# Patient Record
Sex: MALE | Race: WHITE | NOT HISPANIC OR LATINO | Employment: OTHER | URBAN - METROPOLITAN AREA
[De-identification: names, ages, dates, MRNs, and addresses within clinical notes are randomized per-mention and may not be internally consistent; named-entity substitution may affect disease eponyms.]

---

## 2018-06-18 ENCOUNTER — OFFICE VISIT (OUTPATIENT)
Dept: ORTHOPEDIC SURGERY | Facility: CLINIC | Age: 48
End: 2018-06-18

## 2018-06-18 VITALS — BODY MASS INDEX: 34.52 KG/M2 | HEART RATE: 86 BPM | WEIGHT: 254.85 LBS | OXYGEN SATURATION: 98 % | HEIGHT: 72 IN

## 2018-06-18 DIAGNOSIS — M25.511 CHRONIC PAIN OF BOTH SHOULDERS: Primary | ICD-10-CM

## 2018-06-18 DIAGNOSIS — M75.91 SUPRASPINATUS TENDINITIS, RIGHT: ICD-10-CM

## 2018-06-18 DIAGNOSIS — G89.29 CHRONIC PAIN OF BOTH SHOULDERS: Primary | ICD-10-CM

## 2018-06-18 DIAGNOSIS — M75.92 SUPRASPINATUS TENDINITIS, LEFT: ICD-10-CM

## 2018-06-18 DIAGNOSIS — M25.512 CHRONIC PAIN OF BOTH SHOULDERS: Primary | ICD-10-CM

## 2018-06-18 PROCEDURE — 99204 OFFICE O/P NEW MOD 45 MIN: CPT | Performed by: ORTHOPAEDIC SURGERY

## 2018-06-18 RX ORDER — IBUPROFEN 600 MG/1
600 TABLET ORAL EVERY 6 HOURS PRN
COMMUNITY
End: 2020-08-31

## 2018-06-18 RX ORDER — LORATADINE 10 MG/1
10 TABLET ORAL DAILY
COMMUNITY
End: 2020-08-31

## 2018-06-18 NOTE — PROGRESS NOTES
[facility] [place] [facility] Note    Subjective     Chief Complaint   Patient presents with   • Left Shoulder - Pain   • Right Shoulder - Pain        HPI      Merlin Francois is a 48 y.o. male.  He complains of bilateral shoulder pain.  Pain for 3 years.  Pain is 7-10 out of 10 at times.  Pain is dull aching and throbbing.  He has not had treatment.  There is no specific injury.  He had complications from a general surgery in [place] 2013.  He is in litigation.  He cannot go back therefore therapy or treatment.  He wants something for pain but cannot take anti-inflammatories because they upset his stomach.  He has not been in pain management before.        Past Medical History:   Diagnosis Date   • COPD (chronic obstructive pulmonary disease)    • Ototoxicity     Extensive abdominal damage      Past Surgical History:   Procedure Laterality Date   • COLECTOMY PARTIAL / TOTAL      x3       Family History   Problem Relation Age of Onset   • Osteoarthritis Mother    • Diabetes Mother    • Heart attack Mother    • Cancer Father    • Osteoarthritis Father      Social History     Social History   • Marital status:      Spouse name: N/A   • Number of children: N/A   • Years of education: N/A     Occupational History   • Not on file.     Social History Main Topics   • Smoking status: Former Smoker     Packs/day: 1.00     Years: 5.00     Types: Cigarettes     Quit date: 2006   • Smokeless tobacco: Never Used   • Alcohol use No   • Drug use: No   • Sexual activity: Not on file     Other Topics Concern   • Not on file     Social History Narrative   • No narrative on file      No current outpatient prescriptions on file prior to visit.     No current facility-administered medications on file prior to visit.       No Known Allergies     The following portions of the patient's history were reviewed and updated as appropriate: allergies, current medications, past family history, past medical history, past social  "history, past surgical history and problem list.    Review of Systems   HENT: Positive for sinus pressure and tinnitus.    Eyes: Positive for visual disturbance.   Respiratory: Negative.    Cardiovascular: Negative.    Gastrointestinal: Negative.    Endocrine: Negative.    Genitourinary: Negative.    Musculoskeletal: Positive for arthralgias (Bilateral shoulders), joint swelling, neck pain and neck stiffness.   Skin: Negative.    Allergic/Immunologic: Positive for environmental allergies.   Neurological: Positive for dizziness and weakness.   Hematological: Negative.    Psychiatric/Behavioral: Negative.         Objective      Physical Exam  Pulse 86   Ht 182 cm (71.65\")   Wt 116 kg (254 lb 13.6 oz)   SpO2 98%   BMI 34.90 kg/m²     Body mass index is 34.9 kg/m².        GENERAL APPEARANCE: awake, alert & oriented x 3, in no acute distress and well developed, well nourished  PSYCH: normal mood and affect  LUNGS:  breathing nonlabored, no wheezing  EYES: sclera anicteric, pupils equal  CARDIOVASCULAR: palpable pulses dorsalis pedis, palpable posterior tibial bilaterally. Capillary refill less than 2 seconds  INTEGUMENTARY: skin intact, no clubbing, cyanosis  NEUROLOGIC:  Normal Sensation and reflexes             Ortho Exam  Musculoskeletal   Upper Extremity   Right Shoulder     Inspection and Palpation:     Tenderness - none    Crepitus - none    Sensation is normal    Examination reveals no ecchymosis.      Strength and Tone:    Supraspinatus,  Infraspinatus - 5/5    Subscapularis - 5/5    Deltoid - 5/5     Range of Motion   Left shoulder:    Internal Rotation: ROM - T7    External Rotation: AROM - 80 degrees    Elevation through flexion: AROM - 80 degrees    Right Shoulder:    Internal Rotation: ROM - T7    External Rotation: AROM - 80 degrees    Elevation through flexion: AROM - 80 degrees     Abduction -80 degrees     Instability   Right shoulder    Sulcus sign negative    Apprehension test negative    Patrice " relocation test negative    Jerk test negative   Impingement   Right shoulder    Ott impingement test positive    Neer impingement test positive   Functional Testing   Right shoulder    AC crossover adduction test negative    Abdominal compression test negative    Lift-off sign negative    Speed's test negative    Reece's test negative    Horriblower's sign negative       Imaging/Studies  Imaging Results (last 7 days)     Procedure Component Value Units Date/Time    XR Shoulder 2+ View Bilateral [[id number]] Resulted:  06/18/18 1415     Updated:  06/18/18 1415    Narrative:       Right Shoulder X-Ray  Indication: Pain  AP, scapular Y, and axillary lateral views    Findings:  No fracture  No bony lesion  Normal soft tissues  Normal joint spaces    No prior studies were available for comparison.  Left Shoulder X-Ray  Indication: Pain  AP, scapular Y, and axillary lateral views    Findings:  No fracture  No bony lesion  Normal soft tissues  Normal joint spaces    No prior studies were available for comparison.              Assessment/Plan        ICD-10-CM ICD-9-CM   1. Chronic pain of both shoulders M25.511 719.41    G89.29 338.29    M25.512    2. Supraspinatus tendinitis, right M75.91 726.10   3. Supraspinatus tendinitis, left M75.92 726.10       Orders Placed This Encounter   Procedures   • XR Shoulder 2+ View Bilateral   • Ambulatory Referral to Physical Therapy   • Ambulatory Referral to Pain Management    He has signs of bilateral shoulder impingement.  I offered physical therapy does not want to go.  He says it'll hurt too much.  He cannot take anti-inflammatories because of GI intolerance.  I will refer to pain management as that is his main complaint is wanting something for pain.  He needs physical therapy and I'll see him back in 1 month.  He does not want to go to physical therapy.    Medical Decision Making  Management Options : over-the-counter medicine and physical/occupational therapy  Data/Risk:  radiology tests and independent visualization of imaging, lab tests, or EMG/NCV    Robbie Wasserman MD  06/18/18  2:28 PM         EMR Dragon/Transcription disclaimer:  Much of this encounter note is an electronic transcription of spoken language to printed text. Electronic transcription of spoken language may permit erroneous, or at times, nonsensical words or phrases to be inadvertently transcribed. Although I have reviewed the note for such errors, some may still exist.

## 2019-05-20 ENCOUNTER — OFFICE VISIT (OUTPATIENT)
Dept: FAMILY MEDICINE CLINIC | Facility: CLINIC | Age: 49
End: 2019-05-20

## 2019-05-20 VITALS
RESPIRATION RATE: 18 BRPM | SYSTOLIC BLOOD PRESSURE: 140 MMHG | HEART RATE: 82 BPM | BODY MASS INDEX: 36.57 KG/M2 | DIASTOLIC BLOOD PRESSURE: 92 MMHG | WEIGHT: 270 LBS | TEMPERATURE: 98.5 F | HEIGHT: 72 IN

## 2019-05-20 DIAGNOSIS — G89.29 CHRONIC PAIN OF BOTH SHOULDERS: ICD-10-CM

## 2019-05-20 DIAGNOSIS — M25.512 CHRONIC PAIN OF BOTH SHOULDERS: ICD-10-CM

## 2019-05-20 DIAGNOSIS — J30.1 ALLERGIC RHINITIS DUE TO POLLEN, UNSPECIFIED SEASONALITY: ICD-10-CM

## 2019-05-20 DIAGNOSIS — M25.511 CHRONIC PAIN OF BOTH SHOULDERS: ICD-10-CM

## 2019-05-20 DIAGNOSIS — J45.30 MILD PERSISTENT ASTHMA WITHOUT COMPLICATION: ICD-10-CM

## 2019-05-20 DIAGNOSIS — J01.11 ACUTE RECURRENT FRONTAL SINUSITIS: Primary | ICD-10-CM

## 2019-05-20 PROCEDURE — 99203 OFFICE O/P NEW LOW 30 MIN: CPT | Performed by: FAMILY MEDICINE

## 2019-05-20 RX ORDER — ALBUTEROL SULFATE 90 UG/1
AEROSOL, METERED RESPIRATORY (INHALATION)
COMMUNITY
End: 2020-12-11 | Stop reason: SDUPTHER

## 2019-05-20 RX ORDER — PREDNISONE 20 MG/1
TABLET ORAL
Qty: 20 TABLET | Refills: 0 | Status: SHIPPED | OUTPATIENT
Start: 2019-05-20 | End: 2019-08-14 | Stop reason: SDUPTHER

## 2019-05-20 RX ORDER — AMOXICILLIN AND CLAVULANATE POTASSIUM 875; 125 MG/1; MG/1
1 TABLET, FILM COATED ORAL 2 TIMES DAILY
Qty: 20 TABLET | Refills: 0 | Status: SHIPPED | OUTPATIENT
Start: 2019-05-20 | End: 2019-08-14 | Stop reason: SDUPTHER

## 2019-05-20 NOTE — PROGRESS NOTES
Assessment/Plan       Problems Addressed this Visit     None      Visit Diagnoses     Acute recurrent frontal sinusitis    -  Primary    Relevant Medications    amoxicillin-clavulanate (AUGMENTIN) 875-125 MG per tablet    predniSONE (DELTASONE) 20 MG tablet    Allergic rhinitis due to pollen, unspecified seasonality        Chronic pain of both shoulders        Mild persistent asthma without complication        Relevant Medications    albuterol sulfate  (90 Base) MCG/ACT inhaler            Follow up: Return for follow up depends on review of labs and testing.     DISCUSSION  Acute recurrent frontal sinusitis.  Start Augmentin.  High-dose prednisone as noted.  Side effects explained.  Start prednisone in the morning.  This hopefully should help the headache.    Chronic pain of the shoulders.  Need to review old records before making any further recommendations.  He has been to pain management.  He is unable to afford physical therapy.  Reportedly he has been to orthopedics as well.  MRI has been done and we need to review those records before making any further suggestions.    Chronic allergic rhinitis.  Continue follow-up with  allergist.  He is currently on Xolair injections and I explained that we would not be able to give those injections here in the office.  He expressed understanding.    Mild persistent asthma.  Continue albuterol as needed.      MEDICATIONS PRESCRIBED  Requested Prescriptions     Signed Prescriptions Disp Refills   • amoxicillin-clavulanate (AUGMENTIN) 875-125 MG per tablet 20 tablet 0     Sig: Take 1 tablet by mouth 2 (Two) Times a Day.   • predniSONE (DELTASONE) 20 MG tablet 20 tablet 0     Sig: 3 po x 3 days then 2 po x 3 days then 1 po x 3 days then 1/2 po x 3 days        Ulysses dated 5/20/2019 was reviewed and appropriate.      -------------------------------------------    Subjective     Chief Complaint   Patient presents with   • Establish Care   • Allergies   • Headache      "starts hurting to makes him sick          History of Present Illness    Shoulders  Both pain   Pain x years   and power   X 4 yrs   No recent eval and had been referred to P T  MRIs of shoulders 18 months ago  + arthritis and rotator cuff issues      \"autotoxicity\"  Chronic pain   Had seen Dr Corcoran and rec P T and he is reluctant to do that.   Jewell tried shots in the shoulders      Had seen Dr Ortiz in the past    Colectomy  Had reversal that went bad  2013   Diverticulitis and on antibiotic  Then had colectomy done for this. Dr Thomas  Had complications and \"laid for 4 days \"  Had ileostomy for 1 yr and then reversed ( Dr Montoya)  Has normal BM now  Dr Thomas  2 days after the surg    Allergic rhinitis/sinusitis  Terrible  Dr Shipman at [facility]   On Xolair  Not sure if working    Had been on Zpak  Off antibiotic and getting sick again     Headache + drainage  + prod sputum  No recent antibiotic beside the zpak    No fever  + green discharge       and power         Social History     Tobacco Use   Smoking Status Former Smoker   • Packs/day: 1.00   • Years: 5.00   • Pack years: 5.00   • Types: Cigarettes   • Last attempt to quit:    • Years since quittin.3   Smokeless Tobacco Never Used        Past Medical History,Medications, Allergies, and social history was reviewed.      Review of Systems   Constitutional: Negative.    HENT: Positive for congestion and sinus pressure.    Respiratory: Negative.    Cardiovascular: Negative.    Gastrointestinal: Negative.          Large abdominal defect due to history of surgery   Musculoskeletal: Negative.    Neurological: Positive for headache.   Psychiatric/Behavioral: Negative.        Objective     Vitals:    19 1537   BP: 140/92   Pulse: 82   Resp: 18   Temp: 98.5 °F (36.9 °C)   Weight: 122 kg (270 lb)   Height: 182 cm (71.65\")          Physical Exam   Constitutional: Vital signs are normal. He " appears well-developed and well-nourished.   HENT:   Head: Normocephalic and atraumatic.   Right Ear: Hearing, tympanic membrane, external ear and ear canal normal.   Left Ear: Hearing, tympanic membrane, external ear and ear canal normal.   Mouth/Throat: Oropharynx is clear and moist.   Eyes: Conjunctivae, EOM and lids are normal. Pupils are equal, round, and reactive to light.   Neck: Normal range of motion. Neck supple. No thyromegaly present.   Cardiovascular: Normal rate, regular rhythm and normal heart sounds. Exam reveals no friction rub.   No murmur heard.  Pulmonary/Chest: Effort normal and breath sounds normal. No respiratory distress. He has no wheezes. He has no rales.   Abdominal: Soft. Normal appearance and bowel sounds are normal. He exhibits no distension and no mass. There is no tenderness. There is no rebound and no guarding.   Large midline abdominal defect due to surgery with colectomy/ileostomy and ileostomy reversal.  Had reported surgical complications.   Musculoskeletal: He exhibits no edema.        Right shoulder: He exhibits decreased range of motion ( Pain with abduction). He exhibits no tenderness.        Left shoulder: He exhibits decreased range of motion ( Pain with full abduction). He exhibits no tenderness.   Neurological: He is alert. He has normal strength.   Skin: Skin is warm and dry.   Psychiatric: He has a normal mood and affect. His speech is normal. Cognition and memory are normal.   Nursing note and vitals reviewed.                Pierre Barajas MD

## 2019-05-23 ENCOUNTER — TELEPHONE (OUTPATIENT)
Dept: FAMILY MEDICINE CLINIC | Facility: CLINIC | Age: 49
End: 2019-05-23

## 2019-07-05 ENCOUNTER — TELEPHONE (OUTPATIENT)
Dept: FAMILY MEDICINE CLINIC | Facility: CLINIC | Age: 49
End: 2019-07-05

## 2019-07-05 NOTE — TELEPHONE ENCOUNTER
Please call, we did not prescribe this so he will need to contact the specialist that prescribed this.

## 2019-07-05 NOTE — TELEPHONE ENCOUNTER
----- Message from Martha Sanabria sent at 7/5/2019  2:29 PM EDT -----  Contact: DR GUEVARA  PATIENT WANTS TO KNOW WHAT HE SHOULD DO SINCE THE TRELLEGY IS NOT BEING APPROVED BY THE INSURANCE COMPANY? HE SAYS IT IS THE ONLY THING THAT HELPS HIM. PLEASE CALL BACK TO DISCUSS THIS AT [phone]

## 2019-08-12 ENCOUNTER — TELEPHONE (OUTPATIENT)
Dept: FAMILY MEDICINE CLINIC | Facility: CLINIC | Age: 49
End: 2019-08-12

## 2019-08-12 DIAGNOSIS — J01.11 ACUTE RECURRENT FRONTAL SINUSITIS: ICD-10-CM

## 2019-08-12 NOTE — TELEPHONE ENCOUNTER
----- Message from Mila Correia sent at 8/12/2019  4:06 PM EDT -----  Contact: ISMAEL / PT CALL  PT CALLED AND STATED THAT HE HAS A SINUS INFECTION AND WOULD LIKE AN RX CALLED IN  - HE ALSO WANTED TO REMIND DR GUEVARA HE HAS COPD AND ASTHMA - HE IS REQUESTED A STEROID AND ANTBIOTIC    PT CALL BACK [phone]    [facility]

## 2019-08-13 NOTE — TELEPHONE ENCOUNTER
Please call and confirm how long he has had the symptoms?  Did he get better back in May with what we gave him then?    Has he had a fever?  Discolored drainage?

## 2019-08-14 ENCOUNTER — TELEPHONE (OUTPATIENT)
Dept: FAMILY MEDICINE CLINIC | Facility: CLINIC | Age: 49
End: 2019-08-14

## 2019-08-14 RX ORDER — PREDNISONE 20 MG/1
TABLET ORAL
Qty: 20 TABLET | Refills: 0 | Status: SHIPPED | OUTPATIENT
Start: 2019-08-14 | End: 2020-07-10

## 2019-08-14 RX ORDER — AMOXICILLIN AND CLAVULANATE POTASSIUM 875; 125 MG/1; MG/1
1 TABLET, FILM COATED ORAL 2 TIMES DAILY
Qty: 20 TABLET | Refills: 0 | Status: SHIPPED | OUTPATIENT
Start: 2019-08-14 | End: 2020-07-10

## 2019-08-14 NOTE — TELEPHONE ENCOUNTER
Please call, yes , he would nned office visit to assess this and I did send in meds for sinus. See other message if not notified.

## 2019-08-14 NOTE — TELEPHONE ENCOUNTER
Pt calling to check status? Please advise if you can call in medications? He states he is in and out of court/trials and unable to come in right now.

## 2019-08-14 NOTE — TELEPHONE ENCOUNTER
He has had these symptoms for about 4 days, he now has green mucus, congestion, sneezing. What you gave him back in may helped him a lot and would like a refill of that. The prednisone and augmentin helped a lot.       Pharmacy   [facility]

## 2019-08-14 NOTE — TELEPHONE ENCOUNTER
Patient called back was wondering does he need a updated mri for his shoulders or what. His shoulders have gotten to the point it is keeping him up at night stated he can come in next week or so to decide this if needed     Call back number [phone] may left detail vm

## 2019-11-06 ENCOUNTER — TELEPHONE (OUTPATIENT)
Dept: FAMILY MEDICINE CLINIC | Facility: CLINIC | Age: 49
End: 2019-11-06

## 2019-11-06 RX ORDER — ALBUTEROL SULFATE 90 UG/1
AEROSOL, METERED RESPIRATORY (INHALATION)
Status: CANCELLED | OUTPATIENT
Start: 2019-11-06

## 2019-11-06 NOTE — TELEPHONE ENCOUNTER
Patient informed. He said he would go to the emergency room and he would not go to a doctor that wouldn't call him in something over the phone. He wanted to be charged for OV and not come if we would call in something. I advised patient that was against the law for us to do. He said he was changing PCP's and hung up.

## 2019-11-06 NOTE — TELEPHONE ENCOUNTER
PATIENT REQUESTING AN ANTIBIOTIC (ZPAK) FOR SINUS INFECTION AND A REFILL FOR ALBUTEROL INHALER- VENTOLIN HFA.   PATIENT STATED HE DOES NOT WANT TO MAKE AN APPT AND BE AROUND OTHER SICK PEOPLE AND WOULD PREFER THAT DR. GUEVARA SEND A ZPAK AND ONCE HE IS FEELING BETTER HE WILL MAKE AN APPT.     HE STATED THAT HE DOES NOT HAVE A FEVER, HE GETS A SINUS INFECTION A COUPLE TIMES A YEAR. HE WOULD LIKE THE ANTIBIOTIC AND INHALER ASAP.     PLEASE SEND TO [facility]    PATIENT CALL BACK NUMBER UPDATED: [phone]     THANK YOU

## 2019-11-06 NOTE — TELEPHONE ENCOUNTER
Please call, confirm symptoms. How long ? Discolored drainage? Sinus pain or pressure?  Wheezing or shortness of breath.    Since I am OUT of the office today, I may not be able to address this til the morning.     I urgent, then other provider may need to address.

## 2019-11-06 NOTE — TELEPHONE ENCOUNTER
Cough, itching throat, wheezing, green drainage and diarrhea all started yesterday.  Pt ask that this been sent to another provider because he cannot wait til tomorrow.

## 2019-11-06 NOTE — TELEPHONE ENCOUNTER
He would need to be seen to determine what medicine would best be needed.  Ok for [facility] or he can be seen tomorrow

## 2020-06-26 ENCOUNTER — TELEPHONE (OUTPATIENT)
Dept: FAMILY MEDICINE CLINIC | Facility: CLINIC | Age: 50
End: 2020-06-26

## 2020-06-26 NOTE — TELEPHONE ENCOUNTER
PATIENT CALLED IN WANTING TO KNOW IF A REFERRAL COULD BE PLACED FOR HIM TO GO TO A ORTHO DOCTOR ABOUT HIS SHOULDER. HE STATED THAT HE NOW HAS NO QUALITY OF LIFE, HE CAN'T EVEN DO THE DISHES  NOW.       PLEASE ADVISE IF YOU WILL SEND A REFERRAL OR IF HE NEEDS AN APPT.       CALL BACK   [phone]

## 2020-06-26 NOTE — TELEPHONE ENCOUNTER
Please call, I have not seen him since MAy 2019. He would need an appt. BUT the system shows he has an appt with Dr Wasserman on July 1. If that is the case, he would not need to see me 1st.

## 2020-07-10 ENCOUNTER — OFFICE VISIT (OUTPATIENT)
Dept: ORTHOPEDIC SURGERY | Facility: CLINIC | Age: 50
End: 2020-07-10

## 2020-07-10 VITALS — HEIGHT: 72 IN | OXYGEN SATURATION: 97 % | WEIGHT: 265 LBS | BODY MASS INDEX: 35.89 KG/M2 | HEART RATE: 78 BPM

## 2020-07-10 DIAGNOSIS — M25.511 BILATERAL SHOULDER PAIN, UNSPECIFIED CHRONICITY: Primary | ICD-10-CM

## 2020-07-10 DIAGNOSIS — M25.512 BILATERAL SHOULDER PAIN, UNSPECIFIED CHRONICITY: Primary | ICD-10-CM

## 2020-07-10 PROCEDURE — 99213 OFFICE O/P EST LOW 20 MIN: CPT | Performed by: ORTHOPAEDIC SURGERY

## 2020-07-10 NOTE — PROGRESS NOTES
[facility] [place] [facility] Note    Subjective     Chief Complaint   Patient presents with   • Right Shoulder - Pain, Follow-up   • Left Shoulder - Pain, Follow-up        HPI  Merlin Francois is a 50 y.o. male.  He has 9 out of 10 shoulder pain.  He is had it for 8 to 10 years.  Worse with activity.  He is currently disabled because of abdominal surgery.  He says he never wants any type of surgery again.  He had a cortisone shot in the past that only helped 2 days he does not want another one.  He does not want to go to physical therapy because he says that does not help.    Past Medical History:   Diagnosis Date   • Arthritis    • Asthma    • COPD (chronic obstructive pulmonary disease) (CMS/HCC)    • Diverticulosis    • Ototoxicity     Extensive abdominal damage      Past Surgical History:   Procedure Laterality Date   • COLECTOMY PARTIAL / TOTAL      x3    • COLON SURGERY        Family History   Problem Relation Age of Onset   • Osteoarthritis Mother    • Diabetes Mother    • Heart attack Mother    • Arthritis Mother    • Asthma Mother    • Hypertension Mother    • Migraines Mother    • Cancer Father    • Osteoarthritis Father    • Arthritis Father    • Liver disease Father      Social History     Socioeconomic History   • Marital status:      Spouse name: Not on file   • Number of children: Not on file   • Years of education: Not on file   • Highest education level: Not on file   Tobacco Use   • Smoking status: Former Smoker     Packs/day: 1.00     Years: 5.00     Pack years: 5.00     Types: Cigarettes     Last attempt to quit: 2005     Years since quitting: 15.5   • Smokeless tobacco: Never Used   Substance and Sexual Activity   • Alcohol use: No   • Drug use: No      Current Outpatient Medications on File Prior to Visit   Medication Sig Dispense Refill   • albuterol sulfate  (90 Base) MCG/ACT inhaler albuterol sulfate HFA 90 mcg/actuation aerosol inhaler   Inhale 2 puffs every 4 hours by  "inhalation route.     • BREO ELLIPTA 200-25 MCG/INH aerosol powder       • ibuprofen (ADVIL,MOTRIN) 600 MG tablet Take 600 mg by mouth Every 6 (Six) Hours As Needed for Mild Pain .     • loratadine (CLARITIN) 10 MG tablet Take 10 mg by mouth Daily.     • TRELEGY ELLIPTA 100-62.5-25 MCG/INH aerosol powder       • [DISCONTINUED] amoxicillin-clavulanate (AUGMENTIN) 875-125 MG per tablet Take 1 tablet by mouth 2 (Two) Times a Day. 20 tablet 0   • [DISCONTINUED] predniSONE (DELTASONE) 20 MG tablet 3 po x 3 days then 2 po x 3 days then 1 po x 3 days then 1/2 po x 3 days 20 tablet 0     No current facility-administered medications on file prior to visit.       No Known Allergies     The following portions of the patient's history were reviewed and updated as appropriate: allergies, current medications, past family history, past medical history, past social history, past surgical history and problem list.    Review of Systems   Constitutional: Negative.    HENT: Negative.    Eyes: Negative.    Respiratory: Negative.    Cardiovascular: Negative.    Gastrointestinal: Negative.    Endocrine: Negative.    Genitourinary: Negative.    Musculoskeletal: Positive for arthralgias and joint swelling.   Skin: Negative.    Allergic/Immunologic: Negative.    Neurological: Negative.    Hematological: Negative.    Psychiatric/Behavioral: Negative.         Objective      Physical Exam  Pulse 78   Ht 182 cm (71.65\")   Wt 120 kg (265 lb)   SpO2 97%   BMI 36.29 kg/m²     Body mass index is 36.29 kg/m².    GENERAL APPEARANCE: awake, alert & oriented x 3, in no acute distress and well developed, well nourished  PSYCH: normal mood and affect  LUNGS:  breathing nonlabored, no wheezing  EYES: sclera anicteric, pupils equal  Both shoulders have minimal for flexion abduction.  4-5 strength    Imaging/Studies  Imaging Results (Last 7 Days)     Procedure Component Value Units Date/Time    XR Shoulder 2+ View Bilateral [[id number]] Resulted:  " 07/10/20 1127     Updated:  07/10/20 1127    Narrative:       Right Shoulder X-Ray  Indication: Pain  AP, scapular Y, and axillary lateral views    Findings:  No fracture  No bony lesion  Normal soft tissues  Normal joint spaces    No prior studies were available for comparison.  Left Shoulder X-Ray  Indication: Pain  AP, scapular Y, and axillary lateral views    Findings:  No fracture  No bony lesion  Normal soft tissues  Normal joint spaces    No prior studies were available for comparison.              Assessment/Plan        ICD-10-CM ICD-9-CM   1. Bilateral shoulder pain, unspecified chronicity M25.511 719.41    M25.512        Orders Placed This Encounter   Procedures   • XR Shoulder 2+ View Bilateral   • MRI Shoulder Right Without Contrast   • MRI Shoulder Left Without Contrast      He does not want a cortisone shot.  He says physical therapy does not help.  He says he will never have surgery again.  I ordered MRIs because of the chronicity of his pain and we will see him back after those.  I will refer him to pain management.  He does not want shots therapy or surgery.  Medical Decision Making  Management Options : over-the-counter medicine  Data/Risk: radiology tests and independent visualization of imaging, lab tests, or EMG/NCV    Robbie Wasserman MD  07/10/20  11:35         EMR Dragon/Transcription disclaimer:  Much of this encounter note is an electronic transcription of spoken language to printed text. Electronic transcription of spoken language may permit erroneous, or at times, nonsensical words or phrases to be inadvertently transcribed. Although I have reviewed the note for such errors, some may still exist.

## 2020-07-17 ENCOUNTER — OFFICE VISIT (OUTPATIENT)
Dept: FAMILY MEDICINE CLINIC | Facility: CLINIC | Age: 50
End: 2020-07-17

## 2020-07-17 VITALS
SYSTOLIC BLOOD PRESSURE: 142 MMHG | HEART RATE: 76 BPM | WEIGHT: 270 LBS | DIASTOLIC BLOOD PRESSURE: 84 MMHG | HEIGHT: 72 IN | RESPIRATION RATE: 18 BRPM | TEMPERATURE: 97.8 F | BODY MASS INDEX: 36.57 KG/M2

## 2020-07-17 DIAGNOSIS — I71.21 ASCENDING AORTIC ANEURYSM (HCC): Primary | ICD-10-CM

## 2020-07-17 DIAGNOSIS — M25.512 CHRONIC LEFT SHOULDER PAIN: ICD-10-CM

## 2020-07-17 DIAGNOSIS — G89.29 CHRONIC LEFT SHOULDER PAIN: ICD-10-CM

## 2020-07-17 DIAGNOSIS — I71.9 DESCENDING AORTIC ANEURYSM (HCC): ICD-10-CM

## 2020-07-17 PROCEDURE — 99213 OFFICE O/P EST LOW 20 MIN: CPT | Performed by: FAMILY MEDICINE

## 2020-07-17 RX ORDER — HYDROCODONE BITARTRATE AND ACETAMINOPHEN 5; 325 MG/1; MG/1
1 TABLET ORAL EVERY 4 HOURS PRN
Qty: 18 TABLET | Refills: 0 | Status: SHIPPED | OUTPATIENT
Start: 2020-07-17 | End: 2020-07-31
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
## 2020-07-29 ENCOUNTER — TELEPHONE (OUTPATIENT)
Dept: FAMILY MEDICINE CLINIC | Facility: CLINIC | Age: 50
End: 2020-07-29

## 2020-07-29 DIAGNOSIS — M25.519 ACUTE SHOULDER PAIN, UNSPECIFIED LATERALITY: Primary | ICD-10-CM

## 2020-07-29 DIAGNOSIS — M54.2 NECK PAIN: ICD-10-CM
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
## 2020-07-29 DIAGNOSIS — M89.8X1 CLAVICLE PAIN: ICD-10-CM
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
## 2020-07-29 NOTE — TELEPHONE ENCOUNTER
Spoke with patient he stated that he is having serve shoulder, collar bone, and left side of neck (goes up and down neck) pain. intense and having muscle spasms. Stated sometime he cant even move his arm up.
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
## 2020-07-30 NOTE — TELEPHONE ENCOUNTER
Please call.  I would think we need to refer him to an orthopedic doctor.  I do not believe a neurologist would be able to help him with this.  Let me know if you would like to do that or is he already seeing a orthopedic doctor?

## 2020-07-31 RX ORDER — HYDROCODONE BITARTRATE AND ACETAMINOPHEN 7.5; 325 MG/1; MG/1
1 TABLET ORAL EVERY 6 HOURS PRN
Qty: 15 TABLET | Refills: 0 | Status: SHIPPED | OUTPATIENT
Start: 2020-07-31 | End: 2020-08-04 | Stop reason: SDUPTHER
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
## 2020-07-31 NOTE — TELEPHONE ENCOUNTER
Please call.  Did the hydrocodone I gave him on July 17 to help?    I will place referral to orthopedic.

## 2020-08-03 ENCOUNTER — APPOINTMENT (OUTPATIENT)
Dept: MRI IMAGING | Facility: HOSPITAL | Age: 50
End: 2020-08-03

## 2020-08-04 ENCOUNTER — HOSPITAL ENCOUNTER (OUTPATIENT)
Dept: MRI IMAGING | Facility: HOSPITAL | Age: 50
Discharge: HOME | End: 2020-08-04

## 2020-08-04 ENCOUNTER — TELEPHONE (OUTPATIENT)
Dept: FAMILY MEDICINE CLINIC | Facility: CLINIC | Age: 50
End: 2020-08-04

## 2020-08-04 ENCOUNTER — TELEPHONE (OUTPATIENT)
Dept: ORTHOPEDIC SURGERY | Facility: CLINIC | Age: 50
End: 2020-08-04

## 2020-08-04 ENCOUNTER — HOSPITAL ENCOUNTER (OUTPATIENT)
Dept: MRI IMAGING | Facility: HOSPITAL | Age: 50
Discharge: HOME | End: 2020-08-04
Admitting: ORTHOPAEDIC SURGERY

## 2020-08-04 DIAGNOSIS — M25.511 BILATERAL SHOULDER PAIN, UNSPECIFIED CHRONICITY: Primary | ICD-10-CM

## 2020-08-04 DIAGNOSIS — M25.512 BILATERAL SHOULDER PAIN, UNSPECIFIED CHRONICITY: ICD-10-CM

## 2020-08-04 DIAGNOSIS — M25.519 ACUTE SHOULDER PAIN, UNSPECIFIED LATERALITY: ICD-10-CM

## 2020-08-04 DIAGNOSIS — M25.511 BILATERAL SHOULDER PAIN, UNSPECIFIED CHRONICITY: ICD-10-CM

## 2020-08-04 DIAGNOSIS — M54.2 NECK PAIN: ICD-10-CM

## 2020-08-04 DIAGNOSIS — M89.8X1 CLAVICLE PAIN: ICD-10-CM
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
## 2020-08-04 DIAGNOSIS — M25.512 BILATERAL SHOULDER PAIN, UNSPECIFIED CHRONICITY: Primary | ICD-10-CM

## 2020-08-04 PROCEDURE — 73221 MRI JOINT UPR EXTREM W/O DYE: CPT

## 2020-08-04 RX ORDER — HYDROCODONE BITARTRATE AND ACETAMINOPHEN 7.5; 325 MG/1; MG/1
1 TABLET ORAL EVERY 6 HOURS PRN
Qty: 15 TABLET | Refills: 0 | Status: SHIPPED | OUTPATIENT
Start: 2020-08-04 | End: 2020-08-12 | Stop reason: SDUPTHER

## 2020-08-04 NOTE — TELEPHONE ENCOUNTER
Patient stated he was not able to finish the MRI today because he was in so much pain and wasn't able to fit onto the MRI without being in so much pain and hold his breath. He was able to complete part of it but he would like to know what he is supposed to do now. If there is a different machine he can use that won't cause as much pain and discomfort.     He stated he has 2 pills left of medication, after he gets home today he will take one and then 6 hours after that, so he would like to know what to do next as far as pain medication.     [facility] - [place], [place] - [address] - [phone]  - [phone] FX     Please call patient and advise [phone]

## 2020-08-04 NOTE — TELEPHONE ENCOUNTER
Please call, I can refill the pain med but as far as the MRI, he will need to contact Dr Wasserman who had ordered this.

## 2020-08-04 NOTE — TELEPHONE ENCOUNTER
I spoke to Kandi about this since Dr. Wasserman is out of the office this week. She saw in Dr. Wasserman's office note that he would refer him to pain management so Kandi put in an order for that. I called patient to tell him this and had to leave a voice message for him to call me back.

## 2020-08-04 NOTE — TELEPHONE ENCOUNTER
PATIENT CALLED AND STATED HE TRIED TO DO HIS MRI TODAY BUT WAS UNABLE TO FINISH THEM DUE TO BEING IN SO MUCH PAIN. PATIENT WOULD LIKE TO KNOW WHAT DOCTOR WOULD LIKE TO DO? PATIENT CAN BE REACHED @ [phone]

## 2020-08-05 NOTE — TELEPHONE ENCOUNTER
I spoke to Mr. Francois to let him know that Kandi has put in an order for pain management as Dr. Wasserman had mentioned in an office note.  I told him too to keep his appointment with Dr. Wasserman next week to discuss further imaging modalities for his shoulder, he understood.

## 2020-08-06 NOTE — TELEPHONE ENCOUNTER
Mr. Francois had asked me to let him know about how long before he could get a pain management appointment . I asked Ashley and she said to let him know that it could take 2 weeks. I gave him the number as well to pain management [phone], he understood.

## 2020-08-10 ENCOUNTER — OFFICE VISIT (OUTPATIENT)
Dept: ORTHOPEDIC SURGERY | Facility: CLINIC | Age: 50
End: 2020-08-10

## 2020-08-10 ENCOUNTER — OFFICE VISIT (OUTPATIENT)
Dept: CARDIAC SURGERY | Facility: CLINIC | Age: 50
End: 2020-08-10

## 2020-08-10 VITALS — HEART RATE: 71 BPM | BODY MASS INDEX: 35.94 KG/M2 | HEIGHT: 73 IN | OXYGEN SATURATION: 100 % | WEIGHT: 271.17 LBS

## 2020-08-10 VITALS
HEART RATE: 64 BPM | BODY MASS INDEX: 36.05 KG/M2 | DIASTOLIC BLOOD PRESSURE: 102 MMHG | HEIGHT: 73 IN | WEIGHT: 272 LBS | TEMPERATURE: 98 F | OXYGEN SATURATION: 99 % | SYSTOLIC BLOOD PRESSURE: 154 MMHG

## 2020-08-10 DIAGNOSIS — I71.20 THORACIC AORTIC ANEURYSM, WITHOUT RUPTURE: Primary | ICD-10-CM
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
## 2020-08-10 DIAGNOSIS — M25.511 BILATERAL SHOULDER PAIN, UNSPECIFIED CHRONICITY: Primary | ICD-10-CM

## 2020-08-10 DIAGNOSIS — S46.011D RUPTURE OF RIGHT SUPRASPINATUS TENDON, SUBSEQUENT ENCOUNTER: ICD-10-CM

## 2020-08-10 DIAGNOSIS — M25.512 BILATERAL SHOULDER PAIN, UNSPECIFIED CHRONICITY: Primary | ICD-10-CM

## 2020-08-10 PROCEDURE — 99213 OFFICE O/P EST LOW 20 MIN: CPT | Performed by: ORTHOPAEDIC SURGERY

## 2020-08-10 PROCEDURE — 99204 OFFICE O/P NEW MOD 45 MIN: CPT | Performed by: THORACIC SURGERY (CARDIOTHORACIC VASCULAR SURGERY)
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
## 2020-08-11 ENCOUNTER — DOCUMENTATION (OUTPATIENT)
Dept: ORTHOPEDIC SURGERY | Facility: CLINIC | Age: 50
End: 2020-08-11

## 2020-08-11 NOTE — PROGRESS NOTES
PHONE NOTE  8/11/2020  Approximately 10-15 minute phone call    Mr. Francois called and requested something for his shoulder pain.    He notes he ran out of the pain Rx provided by Dr. Barajas his PCP who provided the last prescription.    I informed him that pain rx/opioids needed to come from his treating provider/providers, minimize number of opioid prescribers and a DAY report and regulations must be followed.    He was very angry throughout the phone call and was not happy with or receptive to the counseling and instructions that I provided regarding shoulder pain.    I provided counseling regarding conservative treatment options to help with pain and even prescription medications (non-opioid) and he deferred.    He remained angry and cursing throughout the call despite my efforts to offer medical advice.    He refused my treatment options and I recommended ER visit if he felt he was having an emergency, discuss with Dr. Wasserman and our office, and discuss with his PCP regarding pain medication refill.

## 2020-08-12 ENCOUNTER — TELEPHONE (OUTPATIENT)
Dept: FAMILY MEDICINE CLINIC | Facility: CLINIC | Age: 50
End: 2020-08-12
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
## 2020-08-12 DIAGNOSIS — M89.8X1 CLAVICLE PAIN: ICD-10-CM

## 2020-08-12 DIAGNOSIS — M25.519 ACUTE SHOULDER PAIN, UNSPECIFIED LATERALITY: ICD-10-CM

## 2020-08-12 DIAGNOSIS — M54.2 NECK PAIN: ICD-10-CM

## 2020-08-12 RX ORDER — HYDROCODONE BITARTRATE AND ACETAMINOPHEN 7.5; 325 MG/1; MG/1
1 TABLET ORAL EVERY 6 HOURS PRN
Qty: 15 TABLET | Refills: 0 | Status: SHIPPED | OUTPATIENT
Start: 2020-08-12 | End: 2020-08-19 | Stop reason: SDUPTHER
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
## 2020-08-12 NOTE — TELEPHONE ENCOUNTER
Will extend temporary pain medication.   Pain management referred from an outside provider, need to contact them to determine status of referral.

## 2020-08-16 ENCOUNTER — TELEPHONE (OUTPATIENT)
Dept: FAMILY MEDICINE CLINIC | Facility: CLINIC | Age: 50
End: 2020-08-16

## 2020-08-16 NOTE — TELEPHONE ENCOUNTER
Please call.  I received a note from the cardiothoracic surgeon about the aneurysm.  It was noted that his blood pressure was quite elevated while there.  He needs to follow-up to have blood pressure rechecked.

## 2020-08-17 NOTE — TELEPHONE ENCOUNTER
LVM for the patient to return our call, office number was provided      HUB okay to relay message and schedule patient if he would like.
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
## 2020-08-19 DIAGNOSIS — M54.2 NECK PAIN: ICD-10-CM

## 2020-08-19 DIAGNOSIS — M89.8X1 CLAVICLE PAIN: ICD-10-CM

## 2020-08-19 DIAGNOSIS — M25.519 ACUTE SHOULDER PAIN, UNSPECIFIED LATERALITY: ICD-10-CM

## 2020-08-19 NOTE — TELEPHONE ENCOUNTER
luis ramos/[facility] calls did a virtual visit with pt and his bp is reading 160/100 she just wanted to let dr hernandez know.

## 2020-08-19 NOTE — TELEPHONE ENCOUNTER
Pt blood pressure has been running extremely high it has been running 148/104 168/101. Once he takes his pain medication his bp drops back down to normal.  He has not heard from the pain clinic yet. He is wanting to know if he may have a refill on his pain mediation. He feels bad that he has to keep calling and requesting the medication refill. He thought that the pain clinic would have already contacted him by now.

## 2020-08-20 RX ORDER — HYDROCODONE BITARTRATE AND ACETAMINOPHEN 7.5; 325 MG/1; MG/1
1 TABLET ORAL EVERY 6 HOURS PRN
Qty: 15 TABLET | Refills: 0 | Status: SHIPPED | OUTPATIENT
Start: 2020-08-20 | End: 2020-08-27 | Stop reason: SDUPTHER

## 2020-08-20 NOTE — TELEPHONE ENCOUNTER
Please call.  He needs to make a follow-up appointment with me so we can check his blood pressure.  He looks like he has one  on September 8 but needs one sooner.    In addition, the pain management referral was placed by orthopedic specialist.  Yvon who may be working with Dr. Wasserman.  He needs to contact them about the pain management referral.    I will go ahead and refill the pain medication.

## 2020-08-20 NOTE — TELEPHONE ENCOUNTER
Pt informed. Pain Mangament referral placed 8/4. Pt hasn't heard from anyone. Jina will you check on this please and call pt today?

## 2020-08-26 ENCOUNTER — TELEPHONE (OUTPATIENT)
Dept: FAMILY MEDICINE CLINIC | Facility: CLINIC | Age: 50
End: 2020-08-26

## 2020-08-26 DIAGNOSIS — M25.519 ACUTE SHOULDER PAIN, UNSPECIFIED LATERALITY: ICD-10-CM
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
## 2020-08-26 DIAGNOSIS — M54.2 NECK PAIN: ICD-10-CM

## 2020-08-26 DIAGNOSIS — M89.8X1 CLAVICLE PAIN: ICD-10-CM

## 2020-08-26 NOTE — TELEPHONE ENCOUNTER
Let him know that I will not be able to refill til Thursday. If needs today, ask another provider.

## 2020-08-27 RX ORDER — HYDROCODONE BITARTRATE AND ACETAMINOPHEN 7.5; 325 MG/1; MG/1
1 TABLET ORAL EVERY 6 HOURS PRN
Qty: 15 TABLET | Refills: 0 | Status: SHIPPED | OUTPATIENT
Start: 2020-08-27 | End: 2020-08-31 | Stop reason: SDUPTHER

## 2020-08-31 ENCOUNTER — OFFICE VISIT (OUTPATIENT)
Dept: FAMILY MEDICINE CLINIC | Facility: CLINIC | Age: 50
End: 2020-08-31

## 2020-08-31 VITALS
BODY MASS INDEX: 36.84 KG/M2 | WEIGHT: 278 LBS | RESPIRATION RATE: 18 BRPM | SYSTOLIC BLOOD PRESSURE: 142 MMHG | TEMPERATURE: 98.2 F | DIASTOLIC BLOOD PRESSURE: 100 MMHG | HEIGHT: 73 IN | HEART RATE: 78 BPM

## 2020-08-31 DIAGNOSIS — M25.511 CHRONIC PAIN OF BOTH SHOULDERS: ICD-10-CM

## 2020-08-31 DIAGNOSIS — I10 ESSENTIAL HYPERTENSION: Primary | ICD-10-CM

## 2020-08-31 DIAGNOSIS — G89.29 CHRONIC PAIN OF BOTH SHOULDERS: ICD-10-CM

## 2020-08-31 DIAGNOSIS — M25.512 CHRONIC PAIN OF BOTH SHOULDERS: ICD-10-CM

## 2020-08-31 DIAGNOSIS — I71.20 THORACIC AORTIC ANEURYSM, WITHOUT RUPTURE: ICD-10-CM

## 2020-08-31 DIAGNOSIS — M54.2 NECK PAIN: ICD-10-CM

## 2020-08-31 PROCEDURE — 99213 OFFICE O/P EST LOW 20 MIN: CPT | Performed by: FAMILY MEDICINE

## 2020-08-31 RX ORDER — LEVOCETIRIZINE DIHYDROCHLORIDE 5 MG/1
5 TABLET, FILM COATED ORAL EVERY EVENING
COMMUNITY

## 2020-08-31 RX ORDER — HYDROCODONE BITARTRATE AND ACETAMINOPHEN 7.5; 325 MG/1; MG/1
1 TABLET ORAL EVERY 6 HOURS PRN
Qty: 15 TABLET | Refills: 0 | Status: SHIPPED | OUTPATIENT
Start: 2020-08-31 | End: 2020-09-04 | Stop reason: SDUPTHER

## 2020-08-31 RX ORDER — TIZANIDINE 4 MG/1
4 TABLET ORAL EVERY 6 HOURS PRN
Qty: 30 TABLET | Refills: 2 | Status: SHIPPED | OUTPATIENT
Start: 2020-08-31 | End: 2020-09-11 | Stop reason: SDUPTHER

## 2020-08-31 NOTE — PROGRESS NOTES
Assessment/Plan       Problems Addressed this Visit        Cardiovascular and Mediastinum    Thoracic aortic aneurysm, without rupture (CMS/HCC)    Relevant Medications    metoprolol tartrate (LOPRESSOR) 25 MG tablet      Other Visit Diagnoses     Essential hypertension    -  Primary    Relevant Medications    metoprolol tartrate (LOPRESSOR) 25 MG tablet    Other Relevant Orders    Comprehensive Metabolic Panel    Lipid Panel With / Chol / HDL Ratio    CBC & Differential    Neck pain        Relevant Medications    tiZANidine (ZANAFLEX) 4 MG tablet    HYDROcodone-acetaminophen (NORCO) 7.5-325 MG per tablet    Chronic pain of both shoulders        Relevant Medications    tiZANidine (ZANAFLEX) 4 MG tablet            Follow up: Return in about 2 weeks (around 9/14/2020).     DISCUSSION  Hypertension.  Given aortic aneurysm.  Start metoprolol 25 mg twice a day.  Follow-up in 2 weeks for recheck.    Neck pain and chronic pain of both shoulders.  Refilled pain medication and trial of tizanidine.  Awaiting pain management evaluation as recommended per orthopedics.          MEDICATIONS PRESCRIBED  Requested Prescriptions     Signed Prescriptions Disp Refills   • tiZANidine (ZANAFLEX) 4 MG tablet 30 tablet 2     Sig: Take 1 tablet by mouth Every 6 (Six) Hours As Needed for Muscle Spasms.   • metoprolol tartrate (LOPRESSOR) 25 MG tablet 60 tablet 2     Sig: Take 1 tablet by mouth 2 (Two) Times a Day.   • HYDROcodone-acetaminophen (NORCO) 7.5-325 MG per tablet 15 tablet 0     Sig: Take 1 tablet by mouth Every 6 (Six) Hours As Needed for Moderate Pain .            Ulysses dated on 8/31/2020   was reviewed and appropriate.        -------------------------------------------    Subjective     Chief Complaint   Patient presents with   • Hypertension     f/u         Hypertension   This is a chronic problem. The current episode started more than 1 year ago. The problem is unchanged. The problem is uncontrolled. (Chronic pain .  "Shoulder pain .   ) There are no associated agents to hypertension. Risk factors: TAA. Past treatments include nothing. Current antihypertension treatment includes nothing. There is no history of CAD/MI or CVA.     Shoulder and neck pain   ++ tight  Had muscle relaxers years ago  Has open MRI tomorrow               Social History     Tobacco Use   Smoking Status Former Smoker   • Packs/day: 1.00   • Years: 5.00   • Pack years: 5.00   • Types: Cigarettes   • Last attempt to quit: 2005   • Years since quitting: 15.6   Smokeless Tobacco Never Used          Past Medical History,Medications, Allergies, and social history was reviewed.          Review of Systems    Objective     Vitals:    08/31/20 0936   BP: 142/100   Pulse: 78   Resp: 18   Temp: 98.2 °F (36.8 °C)   Weight: 126 kg (278 lb)   Height: 185.4 cm (73\")          Physical Exam   Constitutional: Vital signs are normal. He appears well-developed and well-nourished.   HENT:   Head: Normocephalic and atraumatic.   Right Ear: Hearing, tympanic membrane, external ear and ear canal normal.   Left Ear: Hearing, tympanic membrane, external ear and ear canal normal.   Mouth/Throat: Oropharynx is clear and moist.   Eyes: Pupils are equal, round, and reactive to light. Conjunctivae, EOM and lids are normal.   Neck: Normal range of motion. Neck supple. No thyromegaly present.   Cardiovascular: Normal rate, regular rhythm and normal heart sounds. Exam reveals no friction rub.   No murmur heard.  Pulmonary/Chest: Effort normal and breath sounds normal. No respiratory distress. He has no wheezes. He has no rales.   Abdominal: Soft. Normal appearance and bowel sounds are normal. He exhibits no distension and no mass. There is no tenderness. There is no rebound and no guarding.   Musculoskeletal: He exhibits no edema.   Neurological: He is alert. He has normal strength.   Skin: Skin is warm and dry.   Psychiatric: He has a normal mood and affect. His speech is normal. Cognition " and memory are normal.   Nursing note and vitals reviewed.                Pierre Barajas MD

## 2020-09-01 LAB
ALBUMIN SERPL-MCNC: 4.5 G/DL (ref 3.5–5.2)
ALBUMIN/GLOB SERPL: 1.7 G/DL
ALP SERPL-CCNC: 82 U/L (ref 39–117)
ALT SERPL-CCNC: 36 U/L (ref 1–41)
AST SERPL-CCNC: 25 U/L (ref 1–40)
BASOPHILS # BLD AUTO: 0.06 10*3/MM3 (ref 0–0.2)
BASOPHILS NFR BLD AUTO: 0.8 % (ref 0–1.5)
BILIRUB SERPL-MCNC: 0.5 MG/DL (ref 0–1.2)
BUN SERPL-MCNC: 12 MG/DL (ref 6–20)
BUN/CREAT SERPL: 14.5 (ref 7–25)
CALCIUM SERPL-MCNC: 9.2 MG/DL (ref 8.6–10.5)
CHLORIDE SERPL-SCNC: 100 MMOL/L (ref 98–107)
CHOLEST SERPL-MCNC: 221 MG/DL (ref 0–200)
CHOLEST/HDLC SERPL: 6.7 {RATIO}
CO2 SERPL-SCNC: 25.3 MMOL/L (ref 22–29)
CREAT SERPL-MCNC: 0.83 MG/DL (ref 0.76–1.27)
EOSINOPHIL # BLD AUTO: 0.34 10*3/MM3 (ref 0–0.4)
EOSINOPHIL NFR BLD AUTO: 4.6 % (ref 0.3–6.2)
ERYTHROCYTE [DISTWIDTH] IN BLOOD BY AUTOMATED COUNT: 12.5 % (ref 12.3–15.4)
GLOBULIN SER CALC-MCNC: 2.6 GM/DL
GLUCOSE SERPL-MCNC: 108 MG/DL (ref 65–99)
HCT VFR BLD AUTO: 43.6 % (ref 37.5–51)
HDLC SERPL-MCNC: 33 MG/DL (ref 40–60)
HGB BLD-MCNC: 14.6 G/DL (ref 13–17.7)
IMM GRANULOCYTES # BLD AUTO: 0.01 10*3/MM3 (ref 0–0.05)
IMM GRANULOCYTES NFR BLD AUTO: 0.1 % (ref 0–0.5)
LDLC SERPL CALC-MCNC: 110 MG/DL (ref 0–100)
LYMPHOCYTES # BLD AUTO: 2.9 10*3/MM3 (ref 0.7–3.1)
LYMPHOCYTES NFR BLD AUTO: 39.6 % (ref 19.6–45.3)
MCH RBC QN AUTO: 30.5 PG (ref 26.6–33)
MCHC RBC AUTO-ENTMCNC: 33.5 G/DL (ref 31.5–35.7)
MCV RBC AUTO: 91.2 FL (ref 79–97)
MONOCYTES # BLD AUTO: 0.61 10*3/MM3 (ref 0.1–0.9)
MONOCYTES NFR BLD AUTO: 8.3 % (ref 5–12)
NEUTROPHILS # BLD AUTO: 3.41 10*3/MM3 (ref 1.7–7)
NEUTROPHILS NFR BLD AUTO: 46.6 % (ref 42.7–76)
NRBC BLD AUTO-RTO: 0 /100 WBC (ref 0–0.2)
PLATELET # BLD AUTO: 251 10*3/MM3 (ref 140–450)
POTASSIUM SERPL-SCNC: 4.4 MMOL/L (ref 3.5–5.2)
PROT SERPL-MCNC: 7.1 G/DL (ref 6–8.5)
RBC # BLD AUTO: 4.78 10*6/MM3 (ref 4.14–5.8)
SODIUM SERPL-SCNC: 135 MMOL/L (ref 136–145)
TRIGL SERPL-MCNC: 391 MG/DL (ref 0–150)
VLDLC SERPL CALC-MCNC: 78.2 MG/DL
WBC # BLD AUTO: 7.33 10*3/MM3 (ref 3.4–10.8)

## 2020-09-04 ENCOUNTER — TELEPHONE (OUTPATIENT)
Dept: FAMILY MEDICINE CLINIC | Facility: CLINIC | Age: 50
End: 2020-09-04

## 2020-09-04 DIAGNOSIS — M54.2 NECK PAIN: ICD-10-CM

## 2020-09-04 RX ORDER — HYDROCODONE BITARTRATE AND ACETAMINOPHEN 7.5; 325 MG/1; MG/1
1 TABLET ORAL EVERY 6 HOURS PRN
Qty: 18 TABLET | Refills: 0 | Status: SHIPPED | OUTPATIENT
Start: 2020-09-04 | End: 2020-09-11 | Stop reason: SDUPTHER

## 2020-09-04 NOTE — TELEPHONE ENCOUNTER
Caller: Merlin Francois    Relationship: Self    Best call back number: [phone]     Medication needed:   Requested Prescriptions     Pending Prescriptions Disp Refills   • HYDROcodone-acetaminophen (NORCO) 7.5-325 MG per tablet 15 tablet 0     Sig: Take 1 tablet by mouth Every 6 (Six) Hours As Needed for Moderate Pain .       When do you need the refill by: 09/04/20    What details did the patient provide when requesting the medication: PT STATED PCP WAS ASSISTING WITH PROVIDING PAIN MEDICATION UNTIL PT PAIN MANAGEMENT APPT PT ALSO WANTED TO ADVISE PCP THAT THE MUSCLE RELAXER IS HELPING WITH SHOULDER AND NECK SPASMS PLEASE ADVISE    Does the patient have less than a 3 day supply:  [x] Yes  [] No    What is the patient's preferred pharmacy: [facility]

## 2020-09-11 ENCOUNTER — TELEPHONE (OUTPATIENT)
Dept: FAMILY MEDICINE CLINIC | Facility: CLINIC | Age: 50
End: 2020-09-11

## 2020-09-11 DIAGNOSIS — G89.29 CHRONIC PAIN OF BOTH SHOULDERS: ICD-10-CM

## 2020-09-11 DIAGNOSIS — M25.511 CHRONIC PAIN OF BOTH SHOULDERS: ICD-10-CM

## 2020-09-11 DIAGNOSIS — M25.512 CHRONIC PAIN OF BOTH SHOULDERS: ICD-10-CM

## 2020-09-11 DIAGNOSIS — M54.2 NECK PAIN: ICD-10-CM

## 2020-09-11 RX ORDER — HYDROCODONE BITARTRATE AND ACETAMINOPHEN 7.5; 325 MG/1; MG/1
1 TABLET ORAL EVERY 6 HOURS PRN
Qty: 18 TABLET | Refills: 0 | Status: SHIPPED | OUTPATIENT
Start: 2020-09-11 | End: 2020-09-18 | Stop reason: SDUPTHER

## 2020-09-11 RX ORDER — TIZANIDINE 4 MG/1
4 TABLET ORAL EVERY 6 HOURS PRN
Qty: 30 TABLET | Refills: 2 | Status: SHIPPED | OUTPATIENT
Start: 2020-09-11 | End: 2020-10-09 | Stop reason: SDUPTHER

## 2020-09-11 NOTE — TELEPHONE ENCOUNTER
Caller: Merlin Francois    Relationship: Self    Best call back number: [phone]     Medication needed:   Requested Prescriptions     Pending Prescriptions Disp Refills   • HYDROcodone-acetaminophen (NORCO) 7.5-325 MG per tablet 18 tablet 0     Sig: Take 1 tablet by mouth Every 6 (Six) Hours As Needed for Moderate Pain .   • tiZANidine (ZANAFLEX) 4 MG tablet 30 tablet 2     Sig: Take 1 tablet by mouth Every 6 (Six) Hours As Needed for Muscle Spasms.       When do you need the refill by: 9/11/2020  What details did the patient provide when requesting the medication: PATIENT IS OUT OF HYDROCODONE AND IS GETTING LOW ON TIZANIDINE   Does the patient have less than a 3 day supply:  [x] Yes  [] No    What is the patient's preferred pharmacy:    [facility] - [address] [phone] [facility] [phone]

## 2020-09-18 ENCOUNTER — TELEPHONE (OUTPATIENT)
Dept: FAMILY MEDICINE CLINIC | Facility: CLINIC | Age: 50
End: 2020-09-18

## 2020-09-18 DIAGNOSIS — M54.2 NECK PAIN: ICD-10-CM

## 2020-09-18 RX ORDER — HYDROCODONE BITARTRATE AND ACETAMINOPHEN 7.5; 325 MG/1; MG/1
1 TABLET ORAL EVERY 6 HOURS PRN
Qty: 18 TABLET | Refills: 0 | Status: SHIPPED | OUTPATIENT
Start: 2020-09-18 | End: 2020-09-25 | Stop reason: SDUPTHER

## 2020-09-18 NOTE — TELEPHONE ENCOUNTER
PT CALLED TO REQUEST REFILL FOR RX  HYDROcodone-acetaminophen (NORCO) 7.5-325 MG per tablet.  PT HAS NOT BEEN ABLE TO GET IN WITH PAIN MANAGEMENT YET.    PLEASE ADVISE.  CALL BACK:[phone]    [facility] - [address]

## 2020-09-22 DIAGNOSIS — M25.512 BILATERAL SHOULDER PAIN, UNSPECIFIED CHRONICITY: ICD-10-CM

## 2020-09-22 DIAGNOSIS — M25.511 BILATERAL SHOULDER PAIN, UNSPECIFIED CHRONICITY: ICD-10-CM

## 2020-09-22 NOTE — TELEPHONE ENCOUNTER
Been running a little high 138/90. Stated that he can even see better since he started taking the BP meds.

## 2020-09-25 ENCOUNTER — TELEPHONE (OUTPATIENT)
Dept: FAMILY MEDICINE CLINIC | Facility: CLINIC | Age: 50
End: 2020-09-25

## 2020-09-25 DIAGNOSIS — M54.2 NECK PAIN: ICD-10-CM

## 2020-09-25 RX ORDER — HYDROCODONE BITARTRATE AND ACETAMINOPHEN 7.5; 325 MG/1; MG/1
1 TABLET ORAL EVERY 6 HOURS PRN
Qty: 18 TABLET | Refills: 0 | Status: SHIPPED | OUTPATIENT
Start: 2020-09-25 | End: 2020-10-05 | Stop reason: SDUPTHER

## 2020-09-25 NOTE — TELEPHONE ENCOUNTER
Pt informed. Hasn't heard back from Dr Wasserman's office. Suggested he call them and ask where they're referring him to so he can call himself and check on the status of the referral.     Pt understood and will call Monday.

## 2020-09-25 NOTE — TELEPHONE ENCOUNTER
Caller: Merlin Francois    Relationship: Self    Best call back number: [phone]    Medication needed:   Requested Prescriptions     Pending Prescriptions Disp Refills   • HYDROcodone-acetaminophen (NORCO) 7.5-325 MG per tablet 18 tablet 0     Sig: Take 1 tablet by mouth Every 6 (Six) Hours As Needed for Moderate Pain .       When do you need the refill by: TODAY    What details did the patient provide when requesting the medication: PATIENT STATES IS CURRENTLY OUT OF MEDICATION AND IS STILL AWAITING CONTACT FROM THE PAIN CLINIC REGARDING HIS MEDICATION. PATIENT REPORTS HIS BLOOD PRESSURE IS HIGH DUE TO HIS PAIN.     Does the patient have less than a 3 day supply:  [x] Yes  [] No    What is the patient's preferred pharmacy:    [facility] - [address] [phone] [facility] [phone] FX

## 2020-09-25 NOTE — TELEPHONE ENCOUNTER
Please call, requested meds sent to pharmacy.     He will need to contact Dr Wasserman office to follow up on the pain clinic appt.

## 2020-10-05 ENCOUNTER — TELEPHONE (OUTPATIENT)
Dept: FAMILY MEDICINE CLINIC | Facility: CLINIC | Age: 50
End: 2020-10-05

## 2020-10-05 DIAGNOSIS — M54.2 NECK PAIN: ICD-10-CM

## 2020-10-05 RX ORDER — HYDROCODONE BITARTRATE AND ACETAMINOPHEN 7.5; 325 MG/1; MG/1
1 TABLET ORAL EVERY 6 HOURS PRN
Qty: 18 TABLET | Refills: 0 | Status: SHIPPED | OUTPATIENT
Start: 2020-10-05 | End: 2020-10-09 | Stop reason: SDUPTHER

## 2020-10-05 NOTE — TELEPHONE ENCOUNTER
Called informed pt. Patient stated that he contacted Robbie snyder's office. Stated they told him the will have the pain management contact him. Wanted to let you know that he hasn't heard anything yet.

## 2020-10-05 NOTE — TELEPHONE ENCOUNTER
PT CALLED REQUESTING A REFILL FOR:   HYDROcodone-acetaminophen (NORCO) 7.5-325 MG per tablet     [facility] - [address] [phone] [facility] [phone] FX     PT IS COMPLETELY OUT

## 2020-10-09 ENCOUNTER — TELEPHONE (OUTPATIENT)
Dept: FAMILY MEDICINE CLINIC | Facility: CLINIC | Age: 50
End: 2020-10-09

## 2020-10-09 DIAGNOSIS — M25.511 CHRONIC PAIN OF BOTH SHOULDERS: ICD-10-CM

## 2020-10-09 DIAGNOSIS — M54.2 NECK PAIN: ICD-10-CM

## 2020-10-09 DIAGNOSIS — G89.29 CHRONIC PAIN OF BOTH SHOULDERS: ICD-10-CM

## 2020-10-09 DIAGNOSIS — M25.512 CHRONIC PAIN OF BOTH SHOULDERS: ICD-10-CM

## 2020-10-09 RX ORDER — HYDROCODONE BITARTRATE AND ACETAMINOPHEN 7.5; 325 MG/1; MG/1
1 TABLET ORAL EVERY 6 HOURS PRN
Qty: 18 TABLET | Refills: 0 | Status: SHIPPED | OUTPATIENT
Start: 2020-10-09 | End: 2020-10-15 | Stop reason: SDUPTHER

## 2020-10-09 RX ORDER — TIZANIDINE 4 MG/1
4 TABLET ORAL EVERY 6 HOURS PRN
Qty: 30 TABLET | Refills: 2 | Status: SHIPPED | OUTPATIENT
Start: 2020-10-09 | End: 2021-01-11 | Stop reason: SDUPTHER

## 2020-10-09 NOTE — TELEPHONE ENCOUNTER
Caller: Merlin Francois    Relationship: Self    Best call back number: [phone]    Medication needed:   Requested Prescriptions     Pending Prescriptions Disp Refills   • HYDROcodone-acetaminophen (NORCO) 7.5-325 MG per tablet 18 tablet 0     Sig: Take 1 tablet by mouth Every 6 (Six) Hours As Needed for Moderate Pain .   • tiZANidine (ZANAFLEX) 4 MG tablet 30 tablet 2     Sig: Take 1 tablet by mouth Every 6 (Six) Hours As Needed for Muscle Spasms.       When do you need the refill by: 10/09/2020  What details did the patient provide when requesting the medication: Patient is out of medication     Does the patient have less than a 3 day supply:  [x] Yes  [] No    What is the patient's preferred pharmacy: [facility] - [address] [phone] [facility] [phone] FX

## 2020-10-15 ENCOUNTER — TELEPHONE (OUTPATIENT)
Dept: FAMILY MEDICINE CLINIC | Facility: CLINIC | Age: 50
End: 2020-10-15

## 2020-10-15 DIAGNOSIS — M54.2 NECK PAIN: ICD-10-CM

## 2020-10-15 RX ORDER — HYDROCODONE BITARTRATE AND ACETAMINOPHEN 7.5; 325 MG/1; MG/1
1 TABLET ORAL EVERY 6 HOURS PRN
Qty: 18 TABLET | Refills: 0 | Status: SHIPPED | OUTPATIENT
Start: 2020-10-15 | End: 2020-10-22 | Stop reason: SDUPTHER

## 2020-10-15 NOTE — TELEPHONE ENCOUNTER
I sent this in but as stated last week, he is due for office visit with me.  He needs to make an appointment.

## 2020-10-15 NOTE — TELEPHONE ENCOUNTER
Caller: Merlin Francois    Relationship: Self    Best call back number: [phone]     Medication needed:   Requested Prescriptions     Pending Prescriptions Disp Refills   • HYDROcodone-acetaminophen (NORCO) 7.5-325 MG per tablet 18 tablet 0     Sig: Take 1 tablet by mouth Every 6 (Six) Hours As Needed for Moderate Pain .       When do you need the refill by: ASAP    What details did the patient provide when requesting the medication: PATIENT CALLED REQUESTING A REFILL. HE ONLY HAS 2 PILLS LEFT    Does the patient have less than a 3 day supply:  [x] Yes  [] No    What is the patient's preferred pharmacy: [facility] - [address] [phone] [facility] [phone] FX

## 2020-10-22 ENCOUNTER — TELEPHONE (OUTPATIENT)
Dept: FAMILY MEDICINE CLINIC | Facility: CLINIC | Age: 50
End: 2020-10-22

## 2020-10-22 DIAGNOSIS — M54.2 NECK PAIN: ICD-10-CM

## 2020-10-22 RX ORDER — HYDROCODONE BITARTRATE AND ACETAMINOPHEN 7.5; 325 MG/1; MG/1
1 TABLET ORAL EVERY 6 HOURS PRN
Qty: 18 TABLET | Refills: 0 | Status: SHIPPED | OUTPATIENT
Start: 2020-10-22 | End: 2020-10-23

## 2020-10-23 ENCOUNTER — OFFICE VISIT (OUTPATIENT)
Dept: FAMILY MEDICINE CLINIC | Facility: CLINIC | Age: 50
End: 2020-10-23

## 2020-10-23 VITALS
TEMPERATURE: 97.8 F | WEIGHT: 278 LBS | RESPIRATION RATE: 18 BRPM | BODY MASS INDEX: 36.84 KG/M2 | DIASTOLIC BLOOD PRESSURE: 92 MMHG | SYSTOLIC BLOOD PRESSURE: 116 MMHG | HEART RATE: 78 BPM | HEIGHT: 73 IN

## 2020-10-23 DIAGNOSIS — R55 SYNCOPE, UNSPECIFIED SYNCOPE TYPE: Primary | ICD-10-CM

## 2020-10-23 DIAGNOSIS — R53.83 OTHER FATIGUE: ICD-10-CM

## 2020-10-23 DIAGNOSIS — R06.02 SHORTNESS OF BREATH ON EXERTION: ICD-10-CM

## 2020-10-23 DIAGNOSIS — M25.512 CHRONIC PAIN OF BOTH SHOULDERS: ICD-10-CM

## 2020-10-23 DIAGNOSIS — G89.29 CHRONIC PAIN OF BOTH SHOULDERS: ICD-10-CM

## 2020-10-23 DIAGNOSIS — M25.511 CHRONIC PAIN OF BOTH SHOULDERS: ICD-10-CM

## 2020-10-23 PROCEDURE — 99214 OFFICE O/P EST MOD 30 MIN: CPT | Performed by: FAMILY MEDICINE

## 2020-10-23 PROCEDURE — 93000 ELECTROCARDIOGRAM COMPLETE: CPT | Performed by: FAMILY MEDICINE

## 2020-10-23 RX ORDER — HYDROCODONE BITARTRATE AND ACETAMINOPHEN 10; 325 MG/1; MG/1
1 TABLET ORAL EVERY 4 HOURS PRN
Qty: 18 TABLET | Refills: 0 | Status: SHIPPED | OUTPATIENT
Start: 2020-10-23 | End: 2020-10-30 | Stop reason: SDUPTHER

## 2020-10-23 NOTE — PROGRESS NOTES
Assessment/Plan       Diagnoses and all orders for this visit:    1. Syncope, unspecified syncope type (Primary)  -     Ambulatory Referral to Cardiology  -     Adult Transthoracic Echo Complete W/ Cont if Necessary Per Protocol; Future  -     CBC & Differential  -     Comprehensive Metabolic Panel    2. Shortness of breath on exertion  -     Ambulatory Referral to Cardiology  -     Adult Transthoracic Echo Complete W/ Cont if Necessary Per Protocol; Future    3. Other fatigue  -     CBC & Differential  -     Comprehensive Metabolic Panel  -     TSH  -     Vitamin B12    4. Chronic pain of both shoulders  -     HYDROcodone-acetaminophen (NORCO)  MG per tablet; Take 1 tablet by mouth Every 4 (Four) Hours As Needed for Moderate Pain .  Dispense: 18 tablet; Refill: 0           Follow up: Return if symptoms worsen or fail to improve, for follow up depends on review of labs and testing.     DISCUSSION  Syncopal episode.  Unclear etiology.  Recommend check labs as noted.  Echocardiogram.  EKG was normal.  Refer to cardiology as well.  Recommend seek urgent medical attention if symptoms return.  Symptoms does not seem to be consistent with seizures.  He expressed understanding.    Shortness of breath on exertion.  Check labs and echocardiogram.    Fatigue.  Check labs.  Chronic pain of both shoulders.  Change hydrocodone to 10/325.  Continue follow-up with orthopedics.  Pain management pending.          MEDICATIONS PRESCRIBED  Requested Prescriptions     Signed Prescriptions Disp Refills   • HYDROcodone-acetaminophen (NORCO)  MG per tablet 18 tablet 0     Sig: Take 1 tablet by mouth Every 4 (Four) Hours As Needed for Moderate Pain .            Ulysses dated on 10/23/2020   was reviewed and appropriate.        -------------------------------------------    Subjective     Chief Complaint   Patient presents with   • Dizziness     passing out    • Hypertension     160/110 115/101   • Shoulder Pain     pain is  "getting worse          History of Present Illness    3 weeks ago was a friend and felt odd and passed put on way to bathroom. Eyes rolled back and urinated on self. + sweated profusely. And then got better. Harrison dazed after that. Felt foggy.     No witnessed seizure or jerking.     No post ictal state      2 nights ago, was sitting at couch, felt funny, breath felt funny and then felt uncomfortable own skin. /101. Did not pass put then.   No heart racing or palpitations    Feels ok    With Episodes , no chest pain no headache no shortness of breath    Had thoracic aneurysm      Fatigue  Worn out and shortness of breath with activity  Not able to lay flat due to shoulder pain     Had a spell and felt sudden weakness, had been driving and then pulled over and was there for 2 hours ( had slept)  , Felt like shot with tranquilizer    When laughs, legs get weak.     No headaches        Chronic shoulder pain   Decreased sleep  Due to pain   Not seen pain clinic yet, waiting for them to call him back  Needs clearance for surgery    Pain not working so well now    Decreased sleep with pain     FH : mother +_ CHF            Social History     Tobacco Use   Smoking Status Former Smoker   • Packs/day: 1.00   • Years: 5.00   • Pack years: 5.00   • Types: Cigarettes   • Quit date: 2005   • Years since quitting: 15.8   Smokeless Tobacco Never Used          Past Medical History,Medications, Allergies, and social history was reviewed.          Review of Systems   Constitutional: Negative.    HENT: Negative.    Respiratory: Positive for shortness of breath.    Cardiovascular: Negative.    Gastrointestinal: Negative.    Musculoskeletal: Positive for arthralgias.   Neurological: Positive for syncope.   Psychiatric/Behavioral: Negative.        Objective     Vitals:    10/23/20 1012   BP: 116/92   Pulse: 78   Resp: 18   Temp: 97.8 °F (36.6 °C)   Weight: 126 kg (278 lb)   Height: 185.4 cm (73\")          Physical Exam  Vitals signs " and nursing note reviewed.   Constitutional:       Appearance: He is well-developed.   HENT:      Head: Normocephalic and atraumatic.      Right Ear: Tympanic membrane, ear canal and external ear normal.      Left Ear: Tympanic membrane, ear canal and external ear normal.   Eyes:      Pupils: Pupils are equal, round, and reactive to light.   Neck:      Vascular: No carotid bruit.   Cardiovascular:      Rate and Rhythm: Normal rate and regular rhythm.      Heart sounds: Normal heart sounds.   Pulmonary:      Effort: Pulmonary effort is normal. No respiratory distress.      Breath sounds: Normal breath sounds. No wheezing or rales.   Abdominal:      General: Abdomen is flat. Bowel sounds are normal. There is no distension.      Tenderness: There is no abdominal tenderness. There is no guarding or rebound.   Skin:     General: Skin is warm and dry.   Neurological:      Mental Status: He is alert and oriented to person, place, and time.   Psychiatric:         Behavior: Behavior normal.     Unable to lift arms above head at all due to significant shoulder pain.      ECG 12 Lead    Date/Time: 10/23/2020 11:14 AM  Performed by: Pierre Barajas MD  Authorized by: Pierre Barajas MD   Rhythm: sinus rhythm  Rate: normal  BPM: 66  Conduction: conduction normal  ST Segments: ST segments normal  T Waves: T waves normal  QRS axis: normal  Other: no other findings    Clinical impression: normal ECG                     Pierre Barajas MD

## 2020-10-24 LAB
ALBUMIN SERPL-MCNC: 4.5 G/DL (ref 3.5–5.2)
ALBUMIN/GLOB SERPL: 1.7 G/DL
ALP SERPL-CCNC: 88 U/L (ref 39–117)
ALT SERPL-CCNC: 32 U/L (ref 1–41)
AST SERPL-CCNC: 20 U/L (ref 1–40)
BASOPHILS # BLD AUTO: 0.05 10*3/MM3 (ref 0–0.2)
BASOPHILS NFR BLD AUTO: 0.6 % (ref 0–1.5)
BILIRUB SERPL-MCNC: 0.6 MG/DL (ref 0–1.2)
BUN SERPL-MCNC: 13 MG/DL (ref 6–20)
BUN/CREAT SERPL: 14 (ref 7–25)
CALCIUM SERPL-MCNC: 9.7 MG/DL (ref 8.6–10.5)
CHLORIDE SERPL-SCNC: 103 MMOL/L (ref 98–107)
CO2 SERPL-SCNC: 28.8 MMOL/L (ref 22–29)
CREAT SERPL-MCNC: 0.93 MG/DL (ref 0.76–1.27)
EOSINOPHIL # BLD AUTO: 0.26 10*3/MM3 (ref 0–0.4)
EOSINOPHIL NFR BLD AUTO: 3.2 % (ref 0.3–6.2)
ERYTHROCYTE [DISTWIDTH] IN BLOOD BY AUTOMATED COUNT: 13.1 % (ref 12.3–15.4)
GLOBULIN SER CALC-MCNC: 2.7 GM/DL
GLUCOSE SERPL-MCNC: 105 MG/DL (ref 65–99)
HCT VFR BLD AUTO: 44.9 % (ref 37.5–51)
HGB BLD-MCNC: 14.8 G/DL (ref 13–17.7)
IMM GRANULOCYTES # BLD AUTO: 0.02 10*3/MM3 (ref 0–0.05)
IMM GRANULOCYTES NFR BLD AUTO: 0.2 % (ref 0–0.5)
LYMPHOCYTES # BLD AUTO: 2.53 10*3/MM3 (ref 0.7–3.1)
LYMPHOCYTES NFR BLD AUTO: 31.4 % (ref 19.6–45.3)
MCH RBC QN AUTO: 29.7 PG (ref 26.6–33)
MCHC RBC AUTO-ENTMCNC: 33 G/DL (ref 31.5–35.7)
MCV RBC AUTO: 90.2 FL (ref 79–97)
MONOCYTES # BLD AUTO: 0.74 10*3/MM3 (ref 0.1–0.9)
MONOCYTES NFR BLD AUTO: 9.2 % (ref 5–12)
NEUTROPHILS # BLD AUTO: 4.45 10*3/MM3 (ref 1.7–7)
NEUTROPHILS NFR BLD AUTO: 55.4 % (ref 42.7–76)
NRBC BLD AUTO-RTO: 0 /100 WBC (ref 0–0.2)
PLATELET # BLD AUTO: 273 10*3/MM3 (ref 140–450)
POTASSIUM SERPL-SCNC: 5 MMOL/L (ref 3.5–5.2)
PROT SERPL-MCNC: 7.2 G/DL (ref 6–8.5)
RBC # BLD AUTO: 4.98 10*6/MM3 (ref 4.14–5.8)
SODIUM SERPL-SCNC: 138 MMOL/L (ref 136–145)
TSH SERPL DL<=0.005 MIU/L-ACNC: 1.28 UIU/ML (ref 0.27–4.2)
VIT B12 SERPL-MCNC: 323 PG/ML (ref 211–946)
WBC # BLD AUTO: 8.05 10*3/MM3 (ref 3.4–10.8)

## 2020-10-30 DIAGNOSIS — M25.511 CHRONIC PAIN OF BOTH SHOULDERS: ICD-10-CM

## 2020-10-30 DIAGNOSIS — G89.29 CHRONIC PAIN OF BOTH SHOULDERS: ICD-10-CM

## 2020-10-30 DIAGNOSIS — M25.512 CHRONIC PAIN OF BOTH SHOULDERS: ICD-10-CM

## 2020-10-30 RX ORDER — HYDROCODONE BITARTRATE AND ACETAMINOPHEN 10; 325 MG/1; MG/1
1 TABLET ORAL EVERY 4 HOURS PRN
Qty: 18 TABLET | Refills: 0 | Status: SHIPPED | OUTPATIENT
Start: 2020-10-30 | End: 2020-11-06 | Stop reason: SDUPTHER

## 2020-10-30 NOTE — TELEPHONE ENCOUNTER
PATIENT CALLED AND SAID HE NEEDS REFILL ON HIS HYDROCODONE :  4X DAILY    [facility] [place]     URMILA: [phone]

## 2020-11-03 ENCOUNTER — CONSULT (OUTPATIENT)
Dept: CARDIOLOGY | Facility: CLINIC | Age: 50
End: 2020-11-03

## 2020-11-03 VITALS
OXYGEN SATURATION: 98 % | HEART RATE: 67 BPM | DIASTOLIC BLOOD PRESSURE: 78 MMHG | BODY MASS INDEX: 37.37 KG/M2 | WEIGHT: 282 LBS | HEIGHT: 73 IN | SYSTOLIC BLOOD PRESSURE: 132 MMHG

## 2020-11-03 DIAGNOSIS — I77.89 AORTIC ROOT ENLARGEMENT (HCC): ICD-10-CM

## 2020-11-03 DIAGNOSIS — G47.9 SLEEP DISORDER: ICD-10-CM

## 2020-11-03 DIAGNOSIS — R55 SYNCOPE AND COLLAPSE: Primary | ICD-10-CM

## 2020-11-03 PROCEDURE — 93000 ELECTROCARDIOGRAM COMPLETE: CPT | Performed by: INTERNAL MEDICINE

## 2020-11-03 PROCEDURE — 99214 OFFICE O/P EST MOD 30 MIN: CPT | Performed by: INTERNAL MEDICINE
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
## 2020-11-06 ENCOUNTER — TELEPHONE (OUTPATIENT)
Dept: FAMILY MEDICINE CLINIC | Facility: CLINIC | Age: 50
End: 2020-11-06

## 2020-11-06 DIAGNOSIS — M25.511 CHRONIC PAIN OF BOTH SHOULDERS: ICD-10-CM

## 2020-11-06 DIAGNOSIS — G89.29 CHRONIC PAIN OF BOTH SHOULDERS: ICD-10-CM

## 2020-11-06 DIAGNOSIS — M25.512 CHRONIC PAIN OF BOTH SHOULDERS: ICD-10-CM

## 2020-11-06 RX ORDER — HYDROCODONE BITARTRATE AND ACETAMINOPHEN 10; 325 MG/1; MG/1
1 TABLET ORAL EVERY 4 HOURS PRN
Qty: 18 TABLET | Refills: 0 | Status: SHIPPED | OUTPATIENT
Start: 2020-11-06 | End: 2020-11-13 | Stop reason: SDUPTHER

## 2020-11-13 ENCOUNTER — TELEPHONE (OUTPATIENT)
Dept: FAMILY MEDICINE CLINIC | Facility: CLINIC | Age: 50
End: 2020-11-13

## 2020-11-13 DIAGNOSIS — M25.512 CHRONIC PAIN OF BOTH SHOULDERS: ICD-10-CM

## 2020-11-13 DIAGNOSIS — M25.511 CHRONIC PAIN OF BOTH SHOULDERS: ICD-10-CM

## 2020-11-13 DIAGNOSIS — G89.29 CHRONIC PAIN OF BOTH SHOULDERS: ICD-10-CM

## 2020-11-13 RX ORDER — HYDROCODONE BITARTRATE AND ACETAMINOPHEN 10; 325 MG/1; MG/1
1 TABLET ORAL EVERY 4 HOURS PRN
Qty: 18 TABLET | Refills: 0 | Status: SHIPPED | OUTPATIENT
Start: 2020-11-13 | End: 2020-11-20 | Stop reason: SDUPTHER

## 2020-11-13 NOTE — TELEPHONE ENCOUNTER
Caller: Merlin Francois    Relationship: Self    Best call back number: [phone]    Medication needed:   Requested Prescriptions     Pending Prescriptions Disp Refills   • HYDROcodone-acetaminophen (NORCO)  MG per tablet 18 tablet 0     Sig: Take 1 tablet by mouth Every 4 (Four) Hours As Needed for Moderate Pain .       What details did the patient provide when requesting the medication: PATIENT TOTALLY OUT OF MEDICATION.     Does the patient have less than a 3 day supply:  [x] Yes  [] No    What is the patient's preferred pharmacy: [facility] - [facility] [address] [place] [address] [phone] [facility] [phone] FX

## 2020-11-20 ENCOUNTER — TELEPHONE (OUTPATIENT)
Dept: FAMILY MEDICINE CLINIC | Facility: CLINIC | Age: 50
End: 2020-11-20

## 2020-11-20 DIAGNOSIS — M25.511 CHRONIC PAIN OF BOTH SHOULDERS: ICD-10-CM

## 2020-11-20 DIAGNOSIS — G89.29 CHRONIC PAIN OF BOTH SHOULDERS: ICD-10-CM

## 2020-11-20 DIAGNOSIS — M25.512 CHRONIC PAIN OF BOTH SHOULDERS: ICD-10-CM

## 2020-11-20 RX ORDER — HYDROCODONE BITARTRATE AND ACETAMINOPHEN 10; 325 MG/1; MG/1
1 TABLET ORAL EVERY 4 HOURS PRN
Qty: 18 TABLET | Refills: 0 | Status: SHIPPED | OUTPATIENT
Start: 2020-11-20 | End: 2020-11-30 | Stop reason: SDUPTHER

## 2020-11-20 NOTE — TELEPHONE ENCOUNTER
Caller: Merlin Francois    Relationship: Self    Best call back number: [phone]     Medication needed:   Requested Prescriptions     Pending Prescriptions Disp Refills   • HYDROcodone-acetaminophen (NORCO)  MG per tablet 18 tablet 0     Sig: Take 1 tablet by mouth Every 4 (Four) Hours As Needed for Moderate Pain .       When do you need the refill by: ASAP    What details did the patient provide when requesting the medication: PATIENT HAS CALLED REQUESTING A REFILL ON ABOVE MEDICATION.    PATIENT ALSO STATES THAT HE HAS NOT HEARD ANYTHING FROM THE PAIN CLINIC    PATIENT IS ALSO REQUESTING IF DOCTOR CAN PRESCRIBE SOMETHING THAT WOULD HELP THE PATIENT SLEEP AT NIGHT DUE TO THE DISCOMFORT OF THE SHOULDER AND NECK PAIN    Does the patient have less than a 3 day supply:  [x] Yes  [] No    What is the patient's preferred pharmacy: [facility] - [facility] [address] [place] [address] [phone] [facility] [phone] FX

## 2020-11-20 NOTE — TELEPHONE ENCOUNTER
Please call.  I sent this in.  He needs to check with orthopedic on the referral to pain management.  They are the ones that order that.    In regards to sleeping medication, it is not wise to take that with narcotics.  He could try over-the-counter melatonin or Benadryl in the meantime.

## 2020-11-23 DIAGNOSIS — I10 ESSENTIAL HYPERTENSION: ICD-10-CM

## 2020-11-23 DIAGNOSIS — I71.20 THORACIC AORTIC ANEURYSM, WITHOUT RUPTURE: ICD-10-CM

## 2020-11-27 DIAGNOSIS — M25.512 CHRONIC PAIN OF BOTH SHOULDERS: ICD-10-CM

## 2020-11-27 DIAGNOSIS — M25.511 CHRONIC PAIN OF BOTH SHOULDERS: ICD-10-CM

## 2020-11-27 DIAGNOSIS — G89.29 CHRONIC PAIN OF BOTH SHOULDERS: ICD-10-CM

## 2020-11-28 ENCOUNTER — NURSE TRIAGE (OUTPATIENT)
Dept: CALL CENTER | Facility: HOSPITAL | Age: 50
End: 2020-11-28

## 2020-11-28 NOTE — TELEPHONE ENCOUNTER
"    Reason for Disposition  • [1] Caller requesting NON-URGENT health information AND [2] PCP's office is the best resource    Additional Information  • Negative: [1] Caller is not with the adult (patient) AND [2] reporting urgent symptoms  • Negative: Lab result questions  • Negative: Medication questions  • Negative: Caller can't be reached by phone  • Negative: Caller has already spoken to PCP or another triager  • Negative: RN needs further essential information from caller in order to complete triage  • Negative: Requesting regular office appointment    Answer Assessment - Initial Assessment Questions  1. REASON FOR CALL or QUESTION: \"What is your reason for calling today?\" or \"How can I best help you?\" or \"What question do you have that I can help answer?\"      He is out of pain medication and has been trying to get it from the office since Thursday. Explained to him that the office has been closed for the holiday and that there is not any way for us to refill this type of medication after hours. He was instructed that if he was in that much pain, he would need to be seen in ER or walk in clinic of choice for further. He was very upset by this information.    Protocols used: INFORMATION ONLY CALL - NO TRIAGE-ADULT-      "

## 2020-11-30 ENCOUNTER — TELEPHONE (OUTPATIENT)
Dept: FAMILY MEDICINE CLINIC | Facility: CLINIC | Age: 50
End: 2020-11-30

## 2020-11-30 ENCOUNTER — HOSPITAL ENCOUNTER (OUTPATIENT)
Dept: CARDIOLOGY | Facility: HOSPITAL | Age: 50
Discharge: HOME | End: 2020-11-30
Admitting: FAMILY MEDICINE

## 2020-11-30 VITALS — HEIGHT: 73 IN | BODY MASS INDEX: 37.37 KG/M2 | WEIGHT: 282 LBS

## 2020-11-30 DIAGNOSIS — G89.29 CHRONIC PAIN OF BOTH SHOULDERS: ICD-10-CM

## 2020-11-30 DIAGNOSIS — M25.511 CHRONIC PAIN OF BOTH SHOULDERS: ICD-10-CM

## 2020-11-30 DIAGNOSIS — M25.512 CHRONIC PAIN OF BOTH SHOULDERS: ICD-10-CM

## 2020-11-30 DIAGNOSIS — R06.02 SHORTNESS OF BREATH ON EXERTION: ICD-10-CM

## 2020-11-30 DIAGNOSIS — R55 SYNCOPE, UNSPECIFIED SYNCOPE TYPE: ICD-10-CM

## 2020-11-30 LAB
ASCENDING AORTA: 3.7 CM
BH CV ECHO MEAS - AO MAX PG (FULL): 2.4 MMHG
BH CV ECHO MEAS - AO MAX PG: 6.9 MMHG
BH CV ECHO MEAS - AO MEAN PG (FULL): 1.1 MMHG
BH CV ECHO MEAS - AO MEAN PG: 3.7 MMHG
BH CV ECHO MEAS - AO ROOT AREA (BSA CORRECTED): 1.5
BH CV ECHO MEAS - AO ROOT AREA: 11.3 CM^2
BH CV ECHO MEAS - AO ROOT DIAM: 3.8 CM
BH CV ECHO MEAS - AO V2 MAX: 131.4 CM/SEC
BH CV ECHO MEAS - AO V2 MEAN: 91 CM/SEC
BH CV ECHO MEAS - AO V2 VTI: 26.3 CM
BH CV ECHO MEAS - ASC AORTA: 3.4 CM
BH CV ECHO MEAS - AVA(I,A): 3.7 CM^2
BH CV ECHO MEAS - AVA(I,D): 3.7 CM^2
BH CV ECHO MEAS - AVA(V,A): 3.3 CM^2
BH CV ECHO MEAS - AVA(V,D): 3.3 CM^2
BH CV ECHO MEAS - BSA(HAYCOCK): 2.6 M^2
BH CV ECHO MEAS - BSA: 2.5 M^2
BH CV ECHO MEAS - BZI_BMI: 37.2 KILOGRAMS/M^2
BH CV ECHO MEAS - BZI_METRIC_HEIGHT: 185.4 CM
BH CV ECHO MEAS - BZI_METRIC_WEIGHT: 127.9 KG
BH CV ECHO MEAS - EDV(CUBED): 120.8 ML
BH CV ECHO MEAS - EDV(MOD-SP2): 76 ML
BH CV ECHO MEAS - EDV(MOD-SP4): 79 ML
BH CV ECHO MEAS - EDV(TEICH): 115.2 ML
BH CV ECHO MEAS - EF(CUBED): 68.8 %
BH CV ECHO MEAS - EF(MOD-BP): 71 %
BH CV ECHO MEAS - EF(MOD-SP2): 71.1 %
BH CV ECHO MEAS - EF(MOD-SP4): 69.6 %
BH CV ECHO MEAS - EF(TEICH): 60.1 %
BH CV ECHO MEAS - EPSS: 1.1 CM
BH CV ECHO MEAS - ESV(CUBED): 37.7 ML
BH CV ECHO MEAS - ESV(MOD-SP2): 22 ML
BH CV ECHO MEAS - ESV(MOD-SP4): 24 ML
BH CV ECHO MEAS - ESV(TEICH): 45.9 ML
BH CV ECHO MEAS - FS: 32.1 %
BH CV ECHO MEAS - IVS/LVPW: 1.1
BH CV ECHO MEAS - IVSD: 1.2 CM
BH CV ECHO MEAS - LA DIMENSION: 3.8 CM
BH CV ECHO MEAS - LA/AO: 0.99
BH CV ECHO MEAS - LAD MAJOR: 5.2 CM
BH CV ECHO MEAS - LAT PEAK E' VEL: 10.9 CM/SEC
BH CV ECHO MEAS - LATERAL E/E' RATIO: 6.1
BH CV ECHO MEAS - LV DIASTOLIC VOL/BSA (35-75): 31.7 ML/M^2
BH CV ECHO MEAS - LV MASS(C)D: 214.6 GRAMS
BH CV ECHO MEAS - LV MASS(C)DI: 86.2 GRAMS/M^2
BH CV ECHO MEAS - LV MAX PG: 4.5 MMHG
BH CV ECHO MEAS - LV MEAN PG: 2.6 MMHG
BH CV ECHO MEAS - LV SYSTOLIC VOL/BSA (12-30): 9.6 ML/M^2
BH CV ECHO MEAS - LV V1 MAX: 106.1 CM/SEC
BH CV ECHO MEAS - LV V1 MEAN: 75.5 CM/SEC
BH CV ECHO MEAS - LV V1 VTI: 23.9 CM
BH CV ECHO MEAS - LVIDD: 4.9 CM
BH CV ECHO MEAS - LVIDS: 3.4 CM
BH CV ECHO MEAS - LVLD AP2: 7.8 CM
BH CV ECHO MEAS - LVLD AP4: 7.9 CM
BH CV ECHO MEAS - LVLS AP2: 6.8 CM
BH CV ECHO MEAS - LVLS AP4: 6.7 CM
BH CV ECHO MEAS - LVOT AREA (M): 4.2 CM^2
BH CV ECHO MEAS - LVOT AREA: 4.1 CM^2
BH CV ECHO MEAS - LVOT DIAM: 2.3 CM
BH CV ECHO MEAS - LVPWD: 1.1 CM
BH CV ECHO MEAS - MED PEAK E' VEL: 8.8 CM/SEC
BH CV ECHO MEAS - MEDIAL E/E' RATIO: 7.5
BH CV ECHO MEAS - MV A MAX VEL: 64.2 CM/SEC
BH CV ECHO MEAS - MV DEC TIME: 0.25 SEC
BH CV ECHO MEAS - MV E MAX VEL: 67.6 CM/SEC
BH CV ECHO MEAS - MV E/A: 1.1
BH CV ECHO MEAS - PA ACC SLOPE: 475.6 CM/SEC^2
BH CV ECHO MEAS - PA ACC TIME: 0.1 SEC
BH CV ECHO MEAS - PA PR(ACCEL): 34.6 MMHG
BH CV ECHO MEAS - PI END-D VEL: 67.3 CM/SEC
BH CV ECHO MEAS - RAP SYSTOLE: 3 MMHG
BH CV ECHO MEAS - RVDD: 3 CM
BH CV ECHO MEAS - RVSP: 13 MMHG
BH CV ECHO MEAS - SI(AO): 119.3 ML/M^2
BH CV ECHO MEAS - SI(CUBED): 33.4 ML/M^2
BH CV ECHO MEAS - SI(LVOT): 39.5 ML/M^2
BH CV ECHO MEAS - SI(MOD-SP2): 21.7 ML/M^2
BH CV ECHO MEAS - SI(MOD-SP4): 22.1 ML/M^2
BH CV ECHO MEAS - SI(TEICH): 27.8 ML/M^2
BH CV ECHO MEAS - SV(AO): 297.2 ML
BH CV ECHO MEAS - SV(CUBED): 83.1 ML
BH CV ECHO MEAS - SV(LVOT): 98.4 ML
BH CV ECHO MEAS - SV(MOD-SP2): 54 ML
BH CV ECHO MEAS - SV(MOD-SP4): 55 ML
BH CV ECHO MEAS - SV(TEICH): 69.2 ML
BH CV ECHO MEAS - TAPSE (>1.6): 2.6 CM
BH CV ECHO MEAS - TR MAX PG: 10 MMHG
BH CV ECHO MEAS - TR MAX VEL: 155 CM/SEC
BH CV ECHO MEASUREMENTS AVERAGE E/E' RATIO: 6.86
BH CV VAS BP LEFT ARM: NORMAL MMHG
BH CV XLRA - RV BASE: 3.7 CM
BH CV XLRA - RV LENGTH: 8.4 CM
BH CV XLRA - RV MID: 3.2 CM
BH CV XLRA - TDI S': 14.9 CM/SEC
LEFT ATRIUM VOLUME INDEX: 20.1 ML/M^2
LEFT ATRIUM VOLUME: 50 ML
LV EF 2D ECHO EST: 65 %
MAXIMAL PREDICTED HEART RATE: 169 BPM
STRESS TARGET HR: 144 BPM

## 2020-11-30 PROCEDURE — 93306 TTE W/DOPPLER COMPLETE: CPT | Performed by: INTERNAL MEDICINE

## 2020-11-30 PROCEDURE — 93306 TTE W/DOPPLER COMPLETE: CPT

## 2020-11-30 RX ORDER — HYDROCODONE BITARTRATE AND ACETAMINOPHEN 10; 325 MG/1; MG/1
TABLET ORAL
Qty: 18 TABLET | Refills: 0 | OUTPATIENT
Start: 2020-11-30

## 2020-11-30 RX ORDER — HYDROCODONE BITARTRATE AND ACETAMINOPHEN 10; 325 MG/1; MG/1
1 TABLET ORAL EVERY 4 HOURS PRN
Qty: 18 TABLET | Refills: 0 | Status: SHIPPED | OUTPATIENT
Start: 2020-11-30 | End: 2020-12-03 | Stop reason: SDUPTHER

## 2020-11-30 NOTE — TELEPHONE ENCOUNTER
Caller: Merlin Francois    Relationship: Self    Best call back number: [phone]    Medication needed:   Requested Prescriptions     Pending Prescriptions Disp Refills   • HYDROcodone-acetaminophen (NORCO)  MG per tablet 18 tablet 0     Sig: Take 1 tablet by mouth Every 4 (Four) Hours As Needed for Moderate Pain .       When do you need the refill by: ASAP    What details did the patient provide when requesting the medication: PATIENT IS COMPLETELY OUT AND IN PAIN    Does the patient have less than a 3 day supply:  [x] Yes  [] No    What is the patient's preferred pharmacy: [facility] - [address] [phone] [facility] [phone] FX

## 2020-12-03 ENCOUNTER — TELEPHONE (OUTPATIENT)
Dept: FAMILY MEDICINE CLINIC | Facility: CLINIC | Age: 50
End: 2020-12-03

## 2020-12-03 DIAGNOSIS — M25.511 CHRONIC PAIN OF BOTH SHOULDERS: ICD-10-CM

## 2020-12-03 DIAGNOSIS — G89.29 CHRONIC PAIN OF BOTH SHOULDERS: ICD-10-CM

## 2020-12-03 DIAGNOSIS — M25.512 CHRONIC PAIN OF BOTH SHOULDERS: ICD-10-CM

## 2020-12-03 RX ORDER — HYDROCODONE BITARTRATE AND ACETAMINOPHEN 10; 325 MG/1; MG/1
1 TABLET ORAL EVERY 4 HOURS PRN
Qty: 18 TABLET | Refills: 0 | Status: SHIPPED | OUTPATIENT
Start: 2020-12-03 | End: 2020-12-11 | Stop reason: SDUPTHER

## 2020-12-03 NOTE — TELEPHONE ENCOUNTER
Caller: Merlin Francois    Relationship: Self    Best call back number: [phone]    Medication needed:   Requested Prescriptions     Pending Prescriptions Disp Refills   • HYDROcodone-acetaminophen (NORCO)  MG per tablet 18 tablet 0     Sig: Take 1 tablet by mouth Every 4 (Four) Hours As Needed for Moderate Pain .       When do you need the refill by: 12/04/20    What details did the patient provide when requesting the medication: patient has one left    Does the patient have less than a 3 day supply:  [x] Yes  [] No    What is the patient's preferred pharmacy: [facility] - [address] [phone] [facility] [phone] FX

## 2020-12-11 ENCOUNTER — TELEPHONE (OUTPATIENT)
Dept: FAMILY MEDICINE CLINIC | Facility: CLINIC | Age: 50
End: 2020-12-11

## 2020-12-11 DIAGNOSIS — G89.29 CHRONIC PAIN OF BOTH SHOULDERS: ICD-10-CM

## 2020-12-11 DIAGNOSIS — M25.512 CHRONIC PAIN OF BOTH SHOULDERS: ICD-10-CM

## 2020-12-11 DIAGNOSIS — M25.511 CHRONIC PAIN OF BOTH SHOULDERS: ICD-10-CM

## 2020-12-11 RX ORDER — ALBUTEROL SULFATE 90 UG/1
1-2 AEROSOL, METERED RESPIRATORY (INHALATION) EVERY 6 HOURS PRN
Qty: 18 G | Refills: 1 | Status: SHIPPED | OUTPATIENT
Start: 2020-12-11

## 2020-12-11 RX ORDER — HYDROCODONE BITARTRATE AND ACETAMINOPHEN 10; 325 MG/1; MG/1
1 TABLET ORAL EVERY 4 HOURS PRN
Qty: 18 TABLET | Refills: 0 | Status: SHIPPED | OUTPATIENT
Start: 2020-12-11 | End: 2020-12-18 | Stop reason: SDUPTHER

## 2020-12-11 NOTE — TELEPHONE ENCOUNTER
Caller: Merlin Francois    Relationship: Self    Best call back number: [phone]    Medication needed:   Requested Prescriptions     Pending Prescriptions Disp Refills   • HYDROcodone-acetaminophen (NORCO)  MG per tablet 18 tablet 0     Sig: Take 1 tablet by mouth Every 4 (Four) Hours As Needed for Moderate Pain .   • albuterol sulfate  (90 Base) MCG/ACT inhaler          When do you need the refill by: TODAY    Does the patient have less than a 3 day supply:  [x] Yes  [] No    What is the patient's preferred pharmacy: [facility] - [address] [phone] [facility] [phone] FX

## 2020-12-18 ENCOUNTER — TELEPHONE (OUTPATIENT)
Dept: FAMILY MEDICINE CLINIC | Facility: CLINIC | Age: 50
End: 2020-12-18

## 2020-12-18 DIAGNOSIS — G89.29 CHRONIC PAIN OF BOTH SHOULDERS: ICD-10-CM

## 2020-12-18 DIAGNOSIS — M25.512 CHRONIC PAIN OF BOTH SHOULDERS: ICD-10-CM

## 2020-12-18 DIAGNOSIS — M25.511 CHRONIC PAIN OF BOTH SHOULDERS: ICD-10-CM

## 2020-12-18 RX ORDER — HYDROCODONE BITARTRATE AND ACETAMINOPHEN 10; 325 MG/1; MG/1
1 TABLET ORAL EVERY 4 HOURS PRN
Qty: 18 TABLET | Refills: 0 | Status: SHIPPED | OUTPATIENT
Start: 2020-12-18 | End: 2020-12-22 | Stop reason: SDUPTHER

## 2020-12-18 NOTE — TELEPHONE ENCOUNTER
Caller: Merlin Francois    Relationship: Self    Best call back number: [phone]    Medication needed:   Requested Prescriptions     Pending Prescriptions Disp Refills   • HYDROcodone-acetaminophen (NORCO)  MG per tablet 18 tablet 0     Sig: Take 1 tablet by mouth Every 4 (Four) Hours As Needed for Moderate Pain .       What details did the patient provide when requesting the medication: PATIENT TOTALLY OUT OF MEDICATION.    Does the patient have less than a 3 day supply:  [x] Yes  [] No    What is the patient's preferred pharmacy: [facility] - [facility] [address] [place] [address] [phone] [facility] [phone] FX

## 2020-12-22 ENCOUNTER — TELEPHONE (OUTPATIENT)
Dept: FAMILY MEDICINE CLINIC | Facility: CLINIC | Age: 50
End: 2020-12-22

## 2020-12-22 DIAGNOSIS — M25.512 CHRONIC PAIN OF BOTH SHOULDERS: ICD-10-CM

## 2020-12-22 DIAGNOSIS — M25.511 CHRONIC PAIN OF BOTH SHOULDERS: ICD-10-CM

## 2020-12-22 DIAGNOSIS — G89.29 CHRONIC PAIN OF BOTH SHOULDERS: ICD-10-CM

## 2020-12-22 RX ORDER — HYDROCODONE BITARTRATE AND ACETAMINOPHEN 10; 325 MG/1; MG/1
1 TABLET ORAL EVERY 4 HOURS PRN
Qty: 18 TABLET | Refills: 0 | Status: SHIPPED | OUTPATIENT
Start: 2020-12-22 | End: 2020-12-30 | Stop reason: SDUPTHER

## 2020-12-22 NOTE — TELEPHONE ENCOUNTER
Please call, until he signs a pain mgt agreement, I can only give small supply so he is due to follow up and needs to sign one so would need to follow up.    Med sent'

## 2020-12-22 NOTE — TELEPHONE ENCOUNTER
Caller: Merlin Francois    Relationship: Self    Best call back number: [phone]    Medication needed:   Requested Prescriptions     Pending Prescriptions Disp Refills   • HYDROcodone-acetaminophen (NORCO)  MG per tablet 18 tablet 0     Sig: Take 1 tablet by mouth Every 4 (Four) Hours As Needed for Moderate Pain .       When do you need the refill by: ASAP    What details did the patient provide when requesting the medication: PATIENT STATES THAT HE NEEDS HIS MEDICATIONS REFILL FOR UP TO THE FIRST OF THE MONTH    Does the patient have less than a 3 day supply:  [x] Yes  [] No    What is the patient's preferred pharmacy: [facility] - [facility] [address] [place] [address] [phone] [facility] [phone] FX

## 2020-12-30 ENCOUNTER — TELEPHONE (OUTPATIENT)
Dept: FAMILY MEDICINE CLINIC | Facility: CLINIC | Age: 50
End: 2020-12-30

## 2020-12-30 DIAGNOSIS — M25.512 CHRONIC PAIN OF BOTH SHOULDERS: ICD-10-CM

## 2020-12-30 DIAGNOSIS — G89.29 CHRONIC PAIN OF BOTH SHOULDERS: ICD-10-CM

## 2020-12-30 DIAGNOSIS — M25.511 CHRONIC PAIN OF BOTH SHOULDERS: ICD-10-CM

## 2020-12-30 RX ORDER — HYDROCODONE BITARTRATE AND ACETAMINOPHEN 10; 325 MG/1; MG/1
1 TABLET ORAL EVERY 4 HOURS PRN
Qty: 18 TABLET | Refills: 0 | Status: SHIPPED | OUTPATIENT
Start: 2020-12-30 | End: 2021-01-05 | Stop reason: SDUPTHER

## 2020-12-30 NOTE — TELEPHONE ENCOUNTER
Please call, I refilled his pain med. The ortho made the referral for pain clinic so he needs to call them to find out about this. If they are not able to help, then he would need to be seen here to arrange this since not seen in 2 months. He has an appt 1/18 but he may not want to wait that long.

## 2020-12-30 NOTE — TELEPHONE ENCOUNTER
Caller: Merlin Francois    Relationship: Self    Best call back number: [phone]    Medication needed:   Requested Prescriptions     Pending Prescriptions Disp Refills   • HYDROcodone-acetaminophen (NORCO)  MG per tablet 18 tablet 0     Sig: Take 1 tablet by mouth Every 4 (Four) Hours As Needed for Moderate Pain .     SHOULDER PROBLEM SINCE JULY 2020; PAIN CLINIC NEVER CALLING PATIENT TO COORDINATE CARE; CAN'T LIFT EITHER ARM; NEEDS SURGERY BUT UNABLE TO GET IT WITHOUT COORDINATED CARE. PATIENT NEEDS HELP AND DOESN'T UNDERSTAND WHY PAIN CLINIC NOT SCHEDULING OR CALLING HIM. PERHAPS DIFFERENT REFERRAL NEEDED.     When do you need the refill by: 12-    What details did the patient provide when requesting the medication:SAME AS ABOVE..    Does the patient have less than a 3 day supply:  [x] Yes  [] No    What is the patient's preferred pharmacy: [facility] - [facility] [address] [place] [address] [phone] [facility] [phone] FX

## 2021-01-05 ENCOUNTER — TELEPHONE (OUTPATIENT)
Dept: FAMILY MEDICINE CLINIC | Facility: CLINIC | Age: 51
End: 2021-01-05

## 2021-01-05 DIAGNOSIS — M25.511 CHRONIC PAIN OF BOTH SHOULDERS: ICD-10-CM

## 2021-01-05 DIAGNOSIS — M25.512 CHRONIC PAIN OF BOTH SHOULDERS: ICD-10-CM

## 2021-01-05 DIAGNOSIS — G89.29 CHRONIC PAIN OF BOTH SHOULDERS: ICD-10-CM

## 2021-01-05 RX ORDER — HYDROCODONE BITARTRATE AND ACETAMINOPHEN 10; 325 MG/1; MG/1
1 TABLET ORAL EVERY 4 HOURS PRN
Qty: 18 TABLET | Refills: 0 | Status: SHIPPED | OUTPATIENT
Start: 2021-01-05 | End: 2021-01-11 | Stop reason: SDUPTHER

## 2021-01-05 NOTE — TELEPHONE ENCOUNTER
.Caller: Merlin Francois    Relationship: Self    Best call back number: [phone]     Medication needed:   Requested Prescriptions     Pending Prescriptions Disp Refills   • HYDROcodone-acetaminophen (NORCO)  MG per tablet 18 tablet 0     Sig: Take 1 tablet by mouth Every 4 (Four) Hours As Needed for Moderate Pain .       When do you need the refill by: ASAP    What details did the patient provide when requesting the medication: PATIENT HAS CALLED REQUESTING A REFILL ON ABOVE MEDICATION    Does the patient have less than a 3 day supply:  [x] Yes  [] No    What is the patient's preferred pharmacy: [facility] - [address] [phone] [facility] [phone] FX

## 2021-01-11 ENCOUNTER — TELEPHONE (OUTPATIENT)
Dept: FAMILY MEDICINE CLINIC | Facility: CLINIC | Age: 51
End: 2021-01-11

## 2021-01-11 DIAGNOSIS — I10 ESSENTIAL HYPERTENSION: ICD-10-CM

## 2021-01-11 DIAGNOSIS — I71.20 THORACIC AORTIC ANEURYSM, WITHOUT RUPTURE: ICD-10-CM

## 2021-01-11 DIAGNOSIS — M25.511 CHRONIC PAIN OF BOTH SHOULDERS: ICD-10-CM

## 2021-01-11 DIAGNOSIS — G89.29 CHRONIC PAIN OF BOTH SHOULDERS: ICD-10-CM

## 2021-01-11 DIAGNOSIS — M54.2 NECK PAIN: ICD-10-CM

## 2021-01-11 DIAGNOSIS — M25.512 CHRONIC PAIN OF BOTH SHOULDERS: ICD-10-CM

## 2021-01-11 RX ORDER — TIZANIDINE 4 MG/1
4 TABLET ORAL EVERY 6 HOURS PRN
Qty: 30 TABLET | Refills: 2 | Status: SHIPPED | OUTPATIENT
Start: 2021-01-11 | End: 2021-02-01 | Stop reason: SDUPTHER

## 2021-01-11 RX ORDER — HYDROCODONE BITARTRATE AND ACETAMINOPHEN 10; 325 MG/1; MG/1
1 TABLET ORAL EVERY 4 HOURS PRN
Qty: 18 TABLET | Refills: 0 | Status: SHIPPED | OUTPATIENT
Start: 2021-01-11 | End: 2021-01-15 | Stop reason: SDUPTHER

## 2021-01-11 NOTE — TELEPHONE ENCOUNTER
Caller: Merlin Francois    Relationship: Self    Best call back number: [phone]    Medication needed:   Requested Prescriptions     Pending Prescriptions Disp Refills   • HYDROcodone-acetaminophen (NORCO)  MG per tablet 18 tablet 0     Sig: Take 1 tablet by mouth Every 4 (Four) Hours As Needed for Moderate Pain .   • tiZANidine (ZANAFLEX) 4 MG tablet 30 tablet 2     Sig: Take 1 tablet by mouth Every 6 (Six) Hours As Needed for Muscle Spasms.   • metoprolol tartrate (LOPRESSOR) 25 MG tablet 60 tablet 1     Sig: Take 1 tablet by mouth 2 (Two) Times a Day.       When do you need the refill by: 01/12/21    What details did the patient provide when requesting the medication: patient has 2 pills left    Does the patient have less than a 3 day supply:  [x] Yes  [] No    What is the patient's preferred pharmacy: [facility] - [address] [phone] [facility] [phone] FX

## 2021-01-15 ENCOUNTER — TELEPHONE (OUTPATIENT)
Dept: FAMILY MEDICINE CLINIC | Facility: CLINIC | Age: 51
End: 2021-01-15

## 2021-01-15 DIAGNOSIS — M25.511 CHRONIC PAIN OF BOTH SHOULDERS: ICD-10-CM

## 2021-01-15 DIAGNOSIS — M25.512 CHRONIC PAIN OF BOTH SHOULDERS: ICD-10-CM

## 2021-01-15 DIAGNOSIS — G89.29 CHRONIC PAIN OF BOTH SHOULDERS: ICD-10-CM

## 2021-01-15 RX ORDER — HYDROCODONE BITARTRATE AND ACETAMINOPHEN 10; 325 MG/1; MG/1
1 TABLET ORAL EVERY 4 HOURS PRN
Qty: 18 TABLET | Refills: 0 | Status: SHIPPED | OUTPATIENT
Start: 2021-01-15 | End: 2021-01-21 | Stop reason: SDUPTHER

## 2021-01-15 NOTE — TELEPHONE ENCOUNTER
Caller: Merlin Francois    Relationship: Self    Best call back number:[phone]    Medication needed:   Requested Prescriptions     Pending Prescriptions Disp Refills   • HYDROcodone-acetaminophen (NORCO)  MG per tablet 18 tablet 0     Sig: Take 1 tablet by mouth Every 4 (Four) Hours As Needed for Moderate Pain .       When do you need the refill by: ASAP    What details did the patient provide when requesting the medication: PATIENT STATED HE TOOK LAST TABLET.  PATIENT STATED HIS PHARMACY CLOSES AT 6PM.    Does the patient have less than a 3 day supply:  [x] Yes  [] No    What is the patient's preferred pharmacy: [facility] - [facility] [address] [place] [address] [phone] [facility] [phone] FX

## 2021-01-15 NOTE — TELEPHONE ENCOUNTER
Please call, requested meds sent to pharmacy.   Will see at appt as scheduled next week.

## 2021-01-21 ENCOUNTER — TELEPHONE (OUTPATIENT)
Dept: FAMILY MEDICINE CLINIC | Facility: CLINIC | Age: 51
End: 2021-01-21

## 2021-01-21 DIAGNOSIS — G89.29 CHRONIC PAIN OF BOTH SHOULDERS: ICD-10-CM

## 2021-01-21 DIAGNOSIS — M25.512 CHRONIC PAIN OF BOTH SHOULDERS: ICD-10-CM

## 2021-01-21 DIAGNOSIS — M25.511 CHRONIC PAIN OF BOTH SHOULDERS: ICD-10-CM

## 2021-01-21 RX ORDER — HYDROCODONE BITARTRATE AND ACETAMINOPHEN 10; 325 MG/1; MG/1
1 TABLET ORAL EVERY 4 HOURS PRN
Qty: 18 TABLET | Refills: 0 | Status: SHIPPED | OUTPATIENT
Start: 2021-01-21 | End: 2021-01-27 | Stop reason: SDUPTHER

## 2021-01-21 NOTE — TELEPHONE ENCOUNTER
Caller: Merlin Francois    Relationship: Self    Best call back number: [phone]    Medication needed:   Requested Prescriptions     Pending Prescriptions Disp Refills   • HYDROcodone-acetaminophen (NORCO)  MG per tablet 18 tablet 0     Sig: Take 1 tablet by mouth Every 4 (Four) Hours As Needed for Moderate Pain .       When do you need the refill by: ASAP    What details did the patient provide when requesting the medication: COMPLETELY OUT    Does the patient have less than a 3 day supply:  [x] Yes  [] No    What is the patient's preferred pharmacy: [facility] - [facility] [address] [place] [address] [phone] [facility] [phone] FX

## 2021-01-21 NOTE — TELEPHONE ENCOUNTER
Please call, requested meds sent to pharmacy.     He must follow-up as scheduled on February 1.

## 2021-01-27 ENCOUNTER — TELEPHONE (OUTPATIENT)
Dept: FAMILY MEDICINE CLINIC | Facility: CLINIC | Age: 51
End: 2021-01-27

## 2021-01-27 DIAGNOSIS — M25.511 CHRONIC PAIN OF BOTH SHOULDERS: ICD-10-CM

## 2021-01-27 DIAGNOSIS — M25.512 CHRONIC PAIN OF BOTH SHOULDERS: ICD-10-CM

## 2021-01-27 DIAGNOSIS — G89.29 CHRONIC PAIN OF BOTH SHOULDERS: ICD-10-CM

## 2021-01-27 RX ORDER — HYDROCODONE BITARTRATE AND ACETAMINOPHEN 10; 325 MG/1; MG/1
1 TABLET ORAL EVERY 4 HOURS PRN
Qty: 18 TABLET | Refills: 0 | Status: SHIPPED | OUTPATIENT
Start: 2021-01-27 | End: 2021-02-01 | Stop reason: SDUPTHER

## 2021-01-27 NOTE — TELEPHONE ENCOUNTER
Caller: Merlin Francois    Relationship: Self    Best call back number: [phone]  Medication needed:   Requested Prescriptions     Pending Prescriptions Disp Refills   • HYDROcodone-acetaminophen (NORCO)  MG per tablet 18 tablet 0     Sig: Take 1 tablet by mouth Every 4 (Four) Hours As Needed for Moderate Pain .       When do you need the refill by: ASAP    What details did the patient provide when requesting the medication: HAS ENOUGH FOR HIS DOSE THIS EVENING AND THEN WILL BE OUT.    Does the patient have less than a 3 day supply:  [x] Yes  [] No    What is the patient's preferred pharmacy: [facility] - [address] [phone] [facility] [phone] FX

## 2021-01-27 NOTE — TELEPHONE ENCOUNTER
Please call, requested meds sent to pharmacy.  Keep appointment as scheduled on February 1.

## 2021-02-01 ENCOUNTER — OFFICE VISIT (OUTPATIENT)
Dept: FAMILY MEDICINE CLINIC | Facility: CLINIC | Age: 51
End: 2021-02-01

## 2021-02-01 VITALS
RESPIRATION RATE: 18 BRPM | DIASTOLIC BLOOD PRESSURE: 100 MMHG | SYSTOLIC BLOOD PRESSURE: 150 MMHG | WEIGHT: 287 LBS | TEMPERATURE: 98.7 F | BODY MASS INDEX: 38.04 KG/M2 | HEIGHT: 73 IN | HEART RATE: 76 BPM

## 2021-02-01 DIAGNOSIS — G89.29 CHRONIC PAIN OF BOTH SHOULDERS: Primary | ICD-10-CM

## 2021-02-01 DIAGNOSIS — M54.2 NECK PAIN: ICD-10-CM

## 2021-02-01 DIAGNOSIS — I10 ESSENTIAL HYPERTENSION: ICD-10-CM

## 2021-02-01 DIAGNOSIS — M25.511 CHRONIC PAIN OF BOTH SHOULDERS: Primary | ICD-10-CM

## 2021-02-01 DIAGNOSIS — M25.512 CHRONIC PAIN OF BOTH SHOULDERS: Primary | ICD-10-CM

## 2021-02-01 PROCEDURE — 99214 OFFICE O/P EST MOD 30 MIN: CPT | Performed by: FAMILY MEDICINE

## 2021-02-01 RX ORDER — LISINOPRIL 10 MG/1
10 TABLET ORAL DAILY
Qty: 30 TABLET | Refills: 1 | Status: SHIPPED | OUTPATIENT
Start: 2021-02-01 | End: 2021-03-29

## 2021-02-01 RX ORDER — TIZANIDINE 4 MG/1
4 TABLET ORAL EVERY 6 HOURS PRN
Qty: 30 TABLET | Refills: 2 | Status: SHIPPED | OUTPATIENT
Start: 2021-02-01 | End: 2021-02-05 | Stop reason: SDUPTHER

## 2021-02-01 RX ORDER — HYDROCODONE BITARTRATE AND ACETAMINOPHEN 10; 325 MG/1; MG/1
1 TABLET ORAL EVERY 4 HOURS PRN
Qty: 18 TABLET | Refills: 0 | Status: SHIPPED | OUTPATIENT
Start: 2021-02-01 | End: 2021-02-05 | Stop reason: SDUPTHER

## 2021-02-01 NOTE — PROGRESS NOTES
"  Assessment/Plan       Diagnoses and all orders for this visit:    1. Chronic pain of both shoulders (Primary)  -     HYDROcodone-acetaminophen (NORCO)  MG per tablet; Take 1 tablet by mouth Every 4 (Four) Hours As Needed for Moderate Pain .  Dispense: 18 tablet; Refill: 0  -     tiZANidine (ZANAFLEX) 4 MG tablet; Take 1 tablet by mouth Every 6 (Six) Hours As Needed for Muscle Spasms.  Dispense: 30 tablet; Refill: 2    2. Essential hypertension  -     lisinopril (PRINIVIL,ZESTRIL) 10 MG tablet; Take 1 tablet by mouth Daily.  Dispense: 30 tablet; Refill: 1    3. Neck pain  -     tiZANidine (ZANAFLEX) 4 MG tablet; Take 1 tablet by mouth Every 6 (Six) Hours As Needed for Muscle Spasms.  Dispense: 30 tablet; Refill: 2           Follow up: Return if symptoms worsen or fail to improve.     DISCUSSION  Chronic pain of both shoulders.  Has pain management appointment February 11, 2021.  Continue hydrocodone as needed for severe pain.  As noted above.    Hypertension.  Continue 10 mg 1 tablet daily.    Neck pain. Zanaflex 4 mg 1 every 6 hours for muscle spasms.          MEDICATIONS PRESCRIBED  Requested Prescriptions     Signed Prescriptions Disp Refills   • lisinopril (PRINIVIL,ZESTRIL) 10 MG tablet 30 tablet 1     Sig: Take 1 tablet by mouth Daily.   • HYDROcodone-acetaminophen (NORCO)  MG per tablet 18 tablet 0     Sig: Take 1 tablet by mouth Every 4 (Four) Hours As Needed for Moderate Pain .   • tiZANidine (ZANAFLEX) 4 MG tablet 30 tablet 2     Sig: Take 1 tablet by mouth Every 6 (Six) Hours As Needed for Muscle Spasms.            Ulysses dated on 2/1/2021   was reviewed and appropriate.        -------------------------------------------    Subjective     Chief Complaint   Patient presents with   • Shoulder Pain     both         History of Present Illness    Chronic shoulder pain   Sees pain mgt on 2/11. [facility]    Pain in neck is worse as well, \" the hump\" hurts    Is never out of pain. Med makes it " "tolerable.     On tizanidine for muscle relaxer     On hydrocodone 10/325 , helps ease the pain some      Had been on a zpak for sinus infection and that helped the shoulders and was on prednisone as well.     Sleeps in 15 min intervals  Increased pain and has to move      HTN  No quality of life  Decreased sleep  Wife and sons are moving their stuff today  Bought a house for $350 per month  Stressful moving since he is not able to do anything  No cheat pain  Has BP cuff at home            Social History     Tobacco Use   Smoking Status Former Smoker   • Packs/day: 1.00   • Years: 5.00   • Pack years: 5.00   • Types: Cigarettes   • Quit date:    • Years since quittin.0   Smokeless Tobacco Never Used          Past Medical History,Medications, Allergies, and social history was reviewed.          Review of Systems   Constitutional: Negative.    HENT: Negative.    Respiratory: Negative.    Cardiovascular: Negative.    Gastrointestinal: Negative.    Musculoskeletal: Positive for arthralgias.       Objective     Vitals:    21 1059 21 1114   BP: 176/98 150/100   Pulse: 76    Resp: 18    Temp: 98.7 °F (37.1 °C)    Weight: 130 kg (287 lb)    Height: 185.4 cm (73\")           Physical Exam  Vitals signs and nursing note reviewed.   Constitutional:       Appearance: He is well-developed.   HENT:      Head: Normocephalic and atraumatic.      Right Ear: External ear normal.      Left Ear: External ear normal.   Eyes:      Pupils: Pupils are equal, round, and reactive to light.   Cardiovascular:      Rate and Rhythm: Normal rate and regular rhythm.      Heart sounds: Normal heart sounds.   Pulmonary:      Effort: Pulmonary effort is normal. No respiratory distress.      Breath sounds: Normal breath sounds. No wheezing or rales.   Musculoskeletal:      Cervical back: He exhibits decreased range of motion. He exhibits no tenderness (left side of neck).      Comments: Unable to lift arms due to pain    Skin:     " General: Skin is warm and dry.   Neurological:      Mental Status: He is alert and oriented to person, place, and time.   Psychiatric:         Behavior: Behavior normal.                     Pierre Barajas MD

## 2021-02-05 ENCOUNTER — TELEPHONE (OUTPATIENT)
Dept: FAMILY MEDICINE CLINIC | Facility: CLINIC | Age: 51
End: 2021-02-05

## 2021-02-05 DIAGNOSIS — M25.511 CHRONIC PAIN OF BOTH SHOULDERS: ICD-10-CM

## 2021-02-05 DIAGNOSIS — M25.512 CHRONIC PAIN OF BOTH SHOULDERS: ICD-10-CM

## 2021-02-05 DIAGNOSIS — M54.2 NECK PAIN: ICD-10-CM

## 2021-02-05 DIAGNOSIS — G89.29 CHRONIC PAIN OF BOTH SHOULDERS: ICD-10-CM

## 2021-02-05 RX ORDER — HYDROCODONE BITARTRATE AND ACETAMINOPHEN 10; 325 MG/1; MG/1
1 TABLET ORAL EVERY 4 HOURS PRN
Qty: 18 TABLET | Refills: 0 | Status: SHIPPED | OUTPATIENT
Start: 2021-02-05 | End: 2021-02-10 | Stop reason: SDUPTHER

## 2021-02-05 RX ORDER — TIZANIDINE 4 MG/1
4 TABLET ORAL EVERY 6 HOURS PRN
Qty: 30 TABLET | Refills: 2 | Status: SHIPPED | OUTPATIENT
Start: 2021-02-05 | End: 2021-02-10 | Stop reason: SDUPTHER

## 2021-02-05 NOTE — TELEPHONE ENCOUNTER
Caller: Merlin Francois    Relationship: Self    Best call back number: [phone]    Medication needed:   Requested Prescriptions     Pending Prescriptions Disp Refills   • HYDROcodone-acetaminophen (NORCO)  MG per tablet 18 tablet 0     Sig: Take 1 tablet by mouth Every 4 (Four) Hours As Needed for Moderate Pain .   • tiZANidine (ZANAFLEX) 4 MG tablet 30 tablet 2     Sig: Take 1 tablet by mouth Every 6 (Six) Hours As Needed for Muscle Spasms.       When do you need the refill by: 1/5/2021  What details did the patient provide when requesting the medication: PATIENT IS  COMPLETELY OUT OF MEDICATION    Does the patient have less than a 3 day supply:  [x] Yes  [] No    What is the patient's preferred pharmacy: [facility] - [address] [phone] [facility] [phone] FX

## 2021-02-05 NOTE — TELEPHONE ENCOUNTER
Please call, requested meds sent to pharmacy.   I sent this in.  Please remind him not to call late in the day when he is out of his medication.

## 2021-02-10 ENCOUNTER — TELEPHONE (OUTPATIENT)
Dept: FAMILY MEDICINE CLINIC | Facility: CLINIC | Age: 51
End: 2021-02-10

## 2021-02-10 DIAGNOSIS — M25.511 CHRONIC PAIN OF BOTH SHOULDERS: ICD-10-CM

## 2021-02-10 DIAGNOSIS — M54.2 NECK PAIN: ICD-10-CM

## 2021-02-10 DIAGNOSIS — G89.29 CHRONIC PAIN OF BOTH SHOULDERS: ICD-10-CM

## 2021-02-10 DIAGNOSIS — M25.512 CHRONIC PAIN OF BOTH SHOULDERS: ICD-10-CM

## 2021-02-10 RX ORDER — HYDROCODONE BITARTRATE AND ACETAMINOPHEN 10; 325 MG/1; MG/1
1 TABLET ORAL EVERY 4 HOURS PRN
Qty: 18 TABLET | Refills: 0 | Status: SHIPPED | OUTPATIENT
Start: 2021-02-10 | End: 2022-03-08

## 2021-02-10 RX ORDER — TIZANIDINE 4 MG/1
4 TABLET ORAL EVERY 6 HOURS PRN
Qty: 30 TABLET | Refills: 2 | Status: SHIPPED | OUTPATIENT
Start: 2021-02-10

## 2021-02-10 NOTE — TELEPHONE ENCOUNTER
Caller: Merlin Francois    Relationship: Self    Best call back number: [phone]  Medication needed:   Requested Prescriptions     Pending Prescriptions Disp Refills   • tiZANidine (ZANAFLEX) 4 MG tablet 30 tablet 2     Sig: Take 1 tablet by mouth Every 6 (Six) Hours As Needed for Muscle Spasms.   • HYDROcodone-acetaminophen (NORCO)  MG per tablet 18 tablet 0     Sig: Take 1 tablet by mouth Every 4 (Four) Hours As Needed for Moderate Pain .       When do you need the refill by: ASAP    What details did the patient provide when requesting the medication: PATIENT STATED HE HAS FOR TODAY, BUT WILL BE UNABLE TO GET INTO TOWN DUE TO WEATHER CONDITIONS TOMORROW.      PATIENT WILL NOT BE ABLE TO MAKE IT TO HIS PAIN CLINIC APPT 02/11 DUE TO WEATHER.        Does the patient have less than a 3 day supply:  [x] Yes  [] No    What is the patient's preferred pharmacy: [facility] - [facility] [address] [place] [address] [phone] [facility] [phone]      PHARMACY CLOSES @ 6PM.

## 2021-02-10 NOTE — TELEPHONE ENCOUNTER
Please call, requested meds sent to pharmacy.     Did they r/s his pain clinic appt?

## 2021-03-09 ENCOUNTER — TELEPHONE (OUTPATIENT)
Dept: FAMILY MEDICINE CLINIC | Facility: CLINIC | Age: 51
End: 2021-03-09

## 2021-03-09 RX ORDER — AZITHROMYCIN 250 MG/1
TABLET, FILM COATED ORAL
Qty: 6 TABLET | Refills: 0 | Status: SHIPPED | OUTPATIENT
Start: 2021-03-09 | End: 2022-03-08

## 2021-03-09 NOTE — TELEPHONE ENCOUNTER
Nose was runny for 2 days then now he is super congested. No fever, no loss of taste or smell.. When blowing nose green, pressure in head.

## 2021-03-09 NOTE — TELEPHONE ENCOUNTER
Patient is calling stating that they have a sinus infection and would like to have the pcp call in a prescription for a zpac.  Please advise

## 2021-03-09 NOTE — TELEPHONE ENCOUNTER
Please call, requested meds sent to pharmacy.   Office visit to recheck if not getting better.

## 2021-03-29 DIAGNOSIS — I10 ESSENTIAL HYPERTENSION: ICD-10-CM

## 2021-03-29 RX ORDER — LISINOPRIL 10 MG/1
TABLET ORAL
Qty: 30 TABLET | Refills: 1 | Status: SHIPPED | OUTPATIENT
Start: 2021-03-29 | End: 2022-03-08

## 2021-04-12 DIAGNOSIS — I10 ESSENTIAL HYPERTENSION: ICD-10-CM

## 2021-04-12 DIAGNOSIS — I71.20 THORACIC AORTIC ANEURYSM, WITHOUT RUPTURE: ICD-10-CM

## 2021-06-08 ENCOUNTER — TELEPHONE (OUTPATIENT)
Dept: FAMILY MEDICINE CLINIC | Facility: CLINIC | Age: 51
End: 2021-06-08

## 2021-06-08 DIAGNOSIS — I71.20 THORACIC AORTIC ANEURYSM, WITHOUT RUPTURE: ICD-10-CM

## 2021-06-08 DIAGNOSIS — I10 ESSENTIAL HYPERTENSION: ICD-10-CM

## 2021-07-06 ENCOUNTER — TELEPHONE (OUTPATIENT)
Dept: FAMILY MEDICINE CLINIC | Facility: CLINIC | Age: 51
End: 2021-07-06

## 2021-07-06 DIAGNOSIS — I71.20 THORACIC AORTIC ANEURYSM, WITHOUT RUPTURE: ICD-10-CM

## 2021-07-06 DIAGNOSIS — I10 ESSENTIAL HYPERTENSION: ICD-10-CM

## 2021-07-19 DIAGNOSIS — I71.20 THORACIC AORTIC ANEURYSM, WITHOUT RUPTURE: ICD-10-CM

## 2021-07-19 DIAGNOSIS — I10 ESSENTIAL HYPERTENSION: ICD-10-CM

## 2021-08-03 ENCOUNTER — TELEPHONE (OUTPATIENT)
Dept: CARDIAC SURGERY | Facility: CLINIC | Age: 51
End: 2021-08-03

## 2022-02-07 DIAGNOSIS — I71.20 THORACIC AORTIC ANEURYSM, WITHOUT RUPTURE: ICD-10-CM

## 2022-02-07 DIAGNOSIS — I10 ESSENTIAL HYPERTENSION: ICD-10-CM

## 2022-03-08 ENCOUNTER — OFFICE VISIT (OUTPATIENT)
Dept: FAMILY MEDICINE CLINIC | Facility: CLINIC | Age: 52
End: 2022-03-08

## 2022-03-08 VITALS
HEART RATE: 78 BPM | BODY MASS INDEX: 41.75 KG/M2 | WEIGHT: 315 LBS | DIASTOLIC BLOOD PRESSURE: 92 MMHG | SYSTOLIC BLOOD PRESSURE: 144 MMHG | HEIGHT: 73 IN | TEMPERATURE: 97.5 F | RESPIRATION RATE: 18 BRPM

## 2022-03-08 DIAGNOSIS — I71.20 THORACIC AORTIC ANEURYSM, WITHOUT RUPTURE: ICD-10-CM

## 2022-03-08 DIAGNOSIS — R00.0 TACHYCARDIA: ICD-10-CM

## 2022-03-08 DIAGNOSIS — I10 ESSENTIAL HYPERTENSION: Primary | ICD-10-CM

## 2022-03-08 DIAGNOSIS — E78.2 MIXED HYPERLIPIDEMIA: ICD-10-CM

## 2022-03-08 PROCEDURE — 99214 OFFICE O/P EST MOD 30 MIN: CPT | Performed by: FAMILY MEDICINE

## 2022-03-08 RX ORDER — OXYCODONE HYDROCHLORIDE AND ACETAMINOPHEN 5; 325 MG/1; MG/1
TABLET ORAL
COMMUNITY

## 2022-03-08 NOTE — PROGRESS NOTES
"Chief Complaint  Hypertension (Been running high highest it has been 194/114 pulse 190)    Subjective          Merlin Francois presents to [facility]  History of Present Illness    The patient has consented to being recorded using [employer].    The patient reports that his blood pressure has been elevated since approximately 3 weeks ago. He reports checking his blood pressure 2 times and having a result of 198/122 mmHg and 194/119 mmHg with a pulse of 190 BPM, approximately 1.4 weeks ago. Today, his blood pressure is 144/92 mmHg, and his heart rate is 78 BPM. He denies having head pain. Although, he does report symptoms of dizziness. The patient reports having 2 to 3 episodes and staying awake all night. His blood pressure then increases and he takes medication for his symptoms the next day. He reports being \"miserable\" again for 1 or 2 hours after taking medication. He denies having chest pain or shortness of breath. He reports taking metoprolol 1 time daily and discontinued taking lisinopril because he felt \"out of sorts.\" He reports that his blood pressure gets extremely high when he no longer has medication in his system.     Thoracic aortic aneurysm  The patient reports having an aortic aneurysm. The patient reports seeing a surgeon for his condition and states that the physician was not concerned, and he should just monitor his symptoms. He reports having an aneurysm on both sides. The specialist does not recommend having a stent inserted due to the aneurysms, according to the patient. He was reportedly diagnosed with the aneurysm in 08/2020. He reports previously having 1 CT scan.     Hernia     He reports having hernias. The patient reports weighing 317 pounds and on 02/2021 he weighed 287 pounds. He does have tenderness where his hernias are located. He is not able to do exercise movements due to the occurrence of hernias.     Grief  The patient reports that his son passed " "away from diabetes recently. He previously had pilonidal cyst removed from his tailbone when he was 15 years old and diagnosed with Type 1 diabetes.     The patient report seeing Dr. Baires and Dr. Perez at the pain clinic in [place]. He reports neck and shoulder pain.The patient reports seeing a cardiologist previously. He is fasting today. He denies having episodes of syncope recently. Although, he does have a history of syncopal episodes in the past.    Review of Systems   A review of systems was performed, and the pertinent positives are noted in the HPI.    Objective       Vital Signs:   /92   Pulse 78   Temp 97.5 °F (36.4 °C)   Resp 18   Ht 185.4 cm (73\")   Wt (!) 144 kg (317 lb)   BMI 41.82 kg/m²     Physical Exam  Vitals and nursing note reviewed.   Constitutional:       Appearance: He is well-developed. He is obese.   HENT:      Head: Normocephalic and atraumatic.      Right Ear: External ear normal.      Left Ear: External ear normal.   Eyes:      Pupils: Pupils are equal, round, and reactive to light.   Cardiovascular:      Rate and Rhythm: Normal rate and regular rhythm.      Heart sounds: Normal heart sounds.   Pulmonary:      Effort: Pulmonary effort is normal. No respiratory distress.      Breath sounds: Normal breath sounds. No wheezing or rales.   Abdominal:      General: Bowel sounds are normal.      Tenderness: There is abdominal tenderness ( Mild at site of hernias). There is no rebound.      Hernia: A hernia ( Multiple abdominal hernias) is present.   Musculoskeletal:      Right lower leg: No edema.      Left lower leg: No edema.   Skin:     General: Skin is warm and dry.   Neurological:      Mental Status: He is alert and oriented to person, place, and time.   Psychiatric:         Behavior: Behavior normal.          Result Review :                     Assessment and Plan    Diagnoses and all orders for this visit:    1. Essential hypertension (Primary)  -     metoprolol tartrate " (LOPRESSOR) 25 MG tablet; Take 1 tablet by mouth 2 (Two) Times a Day.  Dispense: 60 tablet; Refill: 2  -     CBC & Differential  -     Comprehensive Metabolic Panel  -     Lipid Panel With / Chol / HDL Ratio    2. Tachycardia  -     CBC & Differential  -     TSH    3. Thoracic aortic aneurysm, without rupture (HCC)  -     metoprolol tartrate (LOPRESSOR) 25 MG tablet; Take 1 tablet by mouth 2 (Two) Times a Day.  Dispense: 60 tablet; Refill: 2  -     CT Angiogram Chest; Future    4. Mixed hyperlipidemia  -     Comprehensive Metabolic Panel  -     Lipid Panel With / Chol / HDL Ratio    1. Hypertension  - The patient's blood pressure has been uncontrolled at home and today's blood pressure is an improvement. We will continue metoprolol 25 mg 2 times daily. He reports that he has only been taking it 1 time a daily. We will check CBC, CMP, and lipid panels.    2. Tachycardia  - His heart rate has been elevated and is 78 BPM. Again, he is going to take the metoprolol 2 times daily. He will let us know in 1 to 2 weeks regarding reading results.    3. Thoracic aortic aneurysm  - He is due for follow-up of CT angiogram of his chest. He will continue Lopressor 25 mg 2 times daily. He was told to seek urgent medical attention in the ER if the blood pressure is that high again with significant tachycardia. He expressed understanding.    4. Hyperlipidemia  - He will check CMP and lipid panels.        DISCUSSION    I explained that he should not be doing any vigorous exercise at this point until his blood pressure is controlled, and we have his aneurysm assessed. He expressed understanding. We will further plan once lab results return, and we will determine follow-up appointment during this time.         Follow Up   Return for follow up depends on review of labs and testing.    Patient was given instructions and counseling regarding his condition or for health maintenance advice. Please see specific information pulled into the AVS  if appropriate.       Pierre Barajas MD     Transcribed from ambient dictation for Pierre Barajas MD by Carlita Quintero.   03/09/22   10:26 EST    Patient verbalized consent to the visit recording.  I have personally performed the services described in this document as transcribed by the above individual, and it is both accurate and complete.  Pierre Barajas MD  3/9/2022  14:56 EST

## 2022-03-09 LAB
ALBUMIN SERPL-MCNC: 4.2 G/DL (ref 3.8–4.9)
ALBUMIN/GLOB SERPL: 1.4 {RATIO} (ref 1.2–2.2)
ALP SERPL-CCNC: 92 IU/L (ref 44–121)
ALT SERPL-CCNC: 92 IU/L (ref 0–44)
AST SERPL-CCNC: 69 IU/L (ref 0–40)
BASOPHILS # BLD AUTO: 0.1 X10E3/UL (ref 0–0.2)
BASOPHILS NFR BLD AUTO: 1 %
BILIRUB SERPL-MCNC: 1 MG/DL (ref 0–1.2)
BUN SERPL-MCNC: 15 MG/DL (ref 6–24)
BUN/CREAT SERPL: 19 (ref 9–20)
CALCIUM SERPL-MCNC: 9.3 MG/DL (ref 8.7–10.2)
CHLORIDE SERPL-SCNC: 100 MMOL/L (ref 96–106)
CHOLEST SERPL-MCNC: 240 MG/DL (ref 100–199)
CHOLEST/HDLC SERPL: 6.2 RATIO (ref 0–5)
CO2 SERPL-SCNC: 22 MMOL/L (ref 20–29)
CREAT SERPL-MCNC: 0.8 MG/DL (ref 0.76–1.27)
EGFR GENE MUT ANL BLD/T: 106 ML/MIN/1.73
EOSINOPHIL # BLD AUTO: 0.3 X10E3/UL (ref 0–0.4)
EOSINOPHIL NFR BLD AUTO: 3 %
ERYTHROCYTE [DISTWIDTH] IN BLOOD BY AUTOMATED COUNT: 12.8 % (ref 11.6–15.4)
GLOBULIN SER CALC-MCNC: 3 G/DL (ref 1.5–4.5)
GLUCOSE SERPL-MCNC: 93 MG/DL (ref 65–99)
HCT VFR BLD AUTO: 44.2 % (ref 37.5–51)
HDLC SERPL-MCNC: 39 MG/DL
HGB BLD-MCNC: 14.8 G/DL (ref 13–17.7)
IMM GRANULOCYTES # BLD AUTO: 0 X10E3/UL (ref 0–0.1)
IMM GRANULOCYTES NFR BLD AUTO: 0 %
LDLC SERPL CALC-MCNC: 149 MG/DL (ref 0–99)
LYMPHOCYTES # BLD AUTO: 2.6 X10E3/UL (ref 0.7–3.1)
LYMPHOCYTES NFR BLD AUTO: 36 %
MCH RBC QN AUTO: 29.8 PG (ref 26.6–33)
MCHC RBC AUTO-ENTMCNC: 33.5 G/DL (ref 31.5–35.7)
MCV RBC AUTO: 89 FL (ref 79–97)
MONOCYTES # BLD AUTO: 0.6 X10E3/UL (ref 0.1–0.9)
MONOCYTES NFR BLD AUTO: 8 %
NEUTROPHILS # BLD AUTO: 3.8 X10E3/UL (ref 1.4–7)
NEUTROPHILS NFR BLD AUTO: 52 %
PLATELET # BLD AUTO: 282 X10E3/UL (ref 150–450)
POTASSIUM SERPL-SCNC: 4.8 MMOL/L (ref 3.5–5.2)
PROT SERPL-MCNC: 7.2 G/DL (ref 6–8.5)
RBC # BLD AUTO: 4.97 X10E6/UL (ref 4.14–5.8)
SODIUM SERPL-SCNC: 138 MMOL/L (ref 134–144)
TRIGL SERPL-MCNC: 281 MG/DL (ref 0–149)
TSH SERPL DL<=0.005 MIU/L-ACNC: 2.39 UIU/ML (ref 0.45–4.5)
VLDLC SERPL CALC-MCNC: 52 MG/DL (ref 5–40)
WBC # BLD AUTO: 7.3 X10E3/UL (ref 3.4–10.8)

## 2022-03-10 LAB
HAV IGM SERPL QL IA: NEGATIVE
HBV CORE IGM SERPL QL IA: NEGATIVE
HBV SURFACE AG SERPL QL IA: NEGATIVE
HCV AB S/CO SERPL IA: <0.1 S/CO RATIO (ref 0–0.9)
Lab: NORMAL
WRITTEN AUTHORIZATION: NORMAL

## 2022-03-21 ENCOUNTER — TELEPHONE (OUTPATIENT)
Dept: FAMILY MEDICINE CLINIC | Facility: CLINIC | Age: 52
End: 2022-03-21

## 2022-03-21 DIAGNOSIS — I71.20 THORACIC AORTIC ANEURYSM, WITHOUT RUPTURE: ICD-10-CM

## 2022-03-21 DIAGNOSIS — I10 ESSENTIAL HYPERTENSION: ICD-10-CM

## 2022-03-21 NOTE — TELEPHONE ENCOUNTER
Please call.  Any discolored drainage.  Fever?  Facial pain?  Shortness of breath?  Productive cough?

## 2022-03-21 NOTE — TELEPHONE ENCOUNTER
PATIENT CALLED BACK AND STATED THAT HIS ALLERGIST CALLED IN THE MEDICATION SO HE NO LONGER IS REQUESTING ANYTHING FROM DR. GUEVARA.  PATIENT STATED THAT HE'S HAVING EPISODES OF BLOOD PRESSURE GOING HIGH.  BLOOD PRESSURE IS RUNNING 165/8 ON AVERAGE.    PATIENT STATED THAT HE HAS TO DO AN UPSTANDING/OPEN CT SCAN.  PATIENT IS ASKING FOR UPDATES.

## 2022-03-21 NOTE — TELEPHONE ENCOUNTER
Please call, I had our referral person check with [facility] and no stand up CT available that they know of. Would we be able to give him something to relax him to help get the CT done. Send back to me so I can address his BP med this evening.

## 2022-03-21 NOTE — TELEPHONE ENCOUNTER
Pt is wanting to know if you could call in a z pack for him. He states that he has a sinus infection he gets these a couple times a year. . He has a runny nose, itchy throat, no voice, sinus drainage, head congestion. He has this for about 2 days. He has asthma and COPD he doesn't want it to get worse. He is concerned with driving 80 miles with gas prices being so high he had reqeusted a video appt but we did not have one open for today.           [facility]

## 2022-03-21 NOTE — TELEPHONE ENCOUNTER
Informed pt we would call back about BP.    He said, he is not nervous about a CT scan it's just he couldn't fit in an MRI before, it's not his stomach, his shoulder's wouldn't even go into it. He said, as long as the CT machine is bigger than an MRI machine.

## 2022-03-21 NOTE — TELEPHONE ENCOUNTER
Please call.  The CT scan is much wider than the MRI and should not have any issue having that done.    In regards to his blood pressure, what has his pulse been running?  We may need to increase the metoprolol dose.

## 2022-03-22 RX ORDER — METOPROLOL TARTRATE 50 MG/1
50 TABLET, FILM COATED ORAL 2 TIMES DAILY
Qty: 60 TABLET | Refills: 2 | Status: SHIPPED | OUTPATIENT
Start: 2022-03-22 | End: 2022-03-28

## 2022-03-22 NOTE — TELEPHONE ENCOUNTER
Please call, rec increase the Metoprolol to 50 mg twice per day and call with reading of BP and HR in 2-3 weeks. Sent to [place]

## 2022-03-28 ENCOUNTER — OFFICE VISIT (OUTPATIENT)
Dept: FAMILY MEDICINE CLINIC | Facility: CLINIC | Age: 52
End: 2022-03-28

## 2022-03-28 VITALS
WEIGHT: 315 LBS | DIASTOLIC BLOOD PRESSURE: 100 MMHG | HEIGHT: 73 IN | HEART RATE: 70 BPM | RESPIRATION RATE: 18 BRPM | TEMPERATURE: 98.1 F | SYSTOLIC BLOOD PRESSURE: 162 MMHG | BODY MASS INDEX: 41.75 KG/M2

## 2022-03-28 DIAGNOSIS — E66.01 MORBID (SEVERE) OBESITY DUE TO EXCESS CALORIES: ICD-10-CM

## 2022-03-28 DIAGNOSIS — R29.818 SUSPECTED SLEEP APNEA: ICD-10-CM

## 2022-03-28 DIAGNOSIS — I10 RESISTANT HYPERTENSION: Primary | ICD-10-CM

## 2022-03-28 DIAGNOSIS — G47.10 HYPERSOMNIA, UNSPECIFIED: ICD-10-CM

## 2022-03-28 PROBLEM — J45.50 SEVERE PERSISTENT ASTHMA: Status: ACTIVE | Noted: 2019-01-29

## 2022-03-28 PROBLEM — M54.12 CERVICAL RADICULOPATHY: Status: ACTIVE | Noted: 2022-02-14

## 2022-03-28 PROBLEM — F45.42 PAIN DISORDER ASSOCIATED WITH PSYCHOLOGICAL FACTORS: Status: ACTIVE | Noted: 2021-04-20

## 2022-03-28 PROCEDURE — 99214 OFFICE O/P EST MOD 30 MIN: CPT | Performed by: FAMILY MEDICINE

## 2022-03-28 RX ORDER — METOPROLOL TARTRATE 100 MG/1
100 TABLET ORAL 2 TIMES DAILY
Qty: 60 TABLET | Refills: 1 | Status: SHIPPED | OUTPATIENT
Start: 2022-03-28 | End: 2022-05-23

## 2022-03-28 RX ORDER — AMLODIPINE BESYLATE 10 MG/1
10 TABLET ORAL DAILY
Qty: 30 TABLET | Refills: 1 | Status: SHIPPED | OUTPATIENT
Start: 2022-03-28 | End: 2022-05-23

## 2022-03-28 NOTE — PROGRESS NOTES
Subjective   Merlin Francois is a 52 y.o. male.     History of Present Illness     His BP has been high at home  Dr. Ashton has increased his metoprolol 50 mg BID but BP too high  At home BP has been:  139/114 with pulse 89  160/118, pulse 74    Used to be on lisinopril but he felt bad while on this and so did not like this    He has been taking some OTC decongestants for sinus pressure recently as he has been dealing with a sinus infection  Recently completed a round of antibiotics      The following portions of the patient's history were reviewed and updated as appropriate: allergies, current medications, past family history, past medical history, past social history, past surgical history and problem list.    Review of Systems   Constitutional: Negative.    Respiratory: Negative.  Negative for shortness of breath.    Cardiovascular: Negative.  Negative for chest pain.   Psychiatric/Behavioral: Negative.        Objective   Physical Exam  Vitals and nursing note reviewed.   Constitutional:       General: He is not in acute distress.     Appearance: Normal appearance. He is well-developed.   Cardiovascular:      Rate and Rhythm: Normal rate and regular rhythm.      Heart sounds: Normal heart sounds.   Pulmonary:      Effort: Pulmonary effort is normal.      Breath sounds: Normal breath sounds.   Neurological:      Mental Status: He is alert and oriented to person, place, and time.   Psychiatric:         Mood and Affect: Mood normal.         Behavior: Behavior normal.         Thought Content: Thought content normal.         Judgment: Judgment normal.         Assessment/Plan   Diagnoses and all orders for this visit:    1. Resistant hypertension (Primary)  -     Home Sleep Study; Future  -     Duplex Renal Artery - Bilateral Complete CAR; Future  -     amLODIPine (Norvasc) 10 MG tablet; Take 1 tablet by mouth Daily.  Dispense: 30 tablet; Refill: 1  -     metoprolol tartrate (LOPRESSOR) 100 MG tablet; Take 1  tablet by mouth 2 (Two) Times a Day.  Dispense: 60 tablet; Refill: 1    2. Morbid (severe) obesity due to excess calories (HCC)  -     Ambulatory Referral to Nutrition Services    3. Suspected sleep apnea  -     Home Sleep Study; Future    4. Hypersomnia, unspecified   -     Home Sleep Study; Future    uncontrolled HTN.  Will increase metoprol to 100 BID and add norvasc 10  Will check for JUDITH as well as LARRY with home sleep study and renal artery doppler.  F/u pending tests  Plan close f/u with PCP in 2-3 weeks

## 2022-04-19 ENCOUNTER — TELEPHONE (OUTPATIENT)
Dept: FAMILY MEDICINE CLINIC | Facility: CLINIC | Age: 52
End: 2022-04-19

## 2022-04-19 NOTE — TELEPHONE ENCOUNTER
Please call patient.  We received a notice from Psychiatric that he does not want to do the home sleep study due to cost.  He wants to do it at the hospital.  Please confirm which hospital he wants to go to.  I can place that referral when I get back in the office on May 3.

## 2022-05-12 DIAGNOSIS — E66.01 MORBID (SEVERE) OBESITY DUE TO EXCESS CALORIES: Primary | ICD-10-CM

## 2022-05-12 DIAGNOSIS — R29.818 SUSPECTED SLEEP APNEA: ICD-10-CM

## 2022-05-12 DIAGNOSIS — G47.10 HYPERSOMNIA, UNSPECIFIED: ICD-10-CM

## 2022-05-18 DIAGNOSIS — R29.818 SUSPECTED SLEEP APNEA: Primary | ICD-10-CM

## 2022-05-18 DIAGNOSIS — G47.10 HYPERSOMNIA, UNSPECIFIED: ICD-10-CM

## 2022-05-18 DIAGNOSIS — E66.01 MORBID (SEVERE) OBESITY DUE TO EXCESS CALORIES: ICD-10-CM

## 2022-05-19 NOTE — ADDENDUM NOTE
Addended by: JAZMIN GUEVARA on: 5/19/2022 05:18 PM     Modules accepted: Orders    
no loss of consciousness, no gait abnormality, no headache, no sensory deficits, and no weakness.

## 2022-05-23 ENCOUNTER — TELEPHONE (OUTPATIENT)
Dept: FAMILY MEDICINE CLINIC | Facility: CLINIC | Age: 52
End: 2022-05-23

## 2022-05-23 DIAGNOSIS — I1A.0 RESISTANT HYPERTENSION: ICD-10-CM

## 2022-05-23 RX ORDER — AMLODIPINE BESYLATE 10 MG/1
TABLET ORAL
Qty: 30 TABLET | Refills: 1 | Status: SHIPPED | OUTPATIENT
Start: 2022-05-23

## 2022-05-23 RX ORDER — METOPROLOL TARTRATE 100 MG/1
TABLET ORAL
Qty: 60 TABLET | Refills: 1 | Status: SHIPPED | OUTPATIENT
Start: 2022-05-23 | End: 2022-09-21

## 2022-05-23 NOTE — TELEPHONE ENCOUNTER
Please call.  I refilled his blood pressure medicine.  Has his blood pressure been doing better?  Did he get the sleep study done?  We had received a call from the sleep lab that he will need to do it in the hospital.  He is due for recheck on the blood pressure in June.

## 2022-06-14 DIAGNOSIS — I71.20 THORACIC AORTIC ANEURYSM, WITHOUT RUPTURE: ICD-10-CM

## 2022-06-14 DIAGNOSIS — I10 ESSENTIAL HYPERTENSION: ICD-10-CM

## 2022-06-14 RX ORDER — METOPROLOL TARTRATE 50 MG/1
TABLET, FILM COATED ORAL
Qty: 60 TABLET | Refills: 0 | OUTPATIENT
Start: 2022-06-14

## 2022-06-15 DIAGNOSIS — I71.20 THORACIC AORTIC ANEURYSM, WITHOUT RUPTURE: ICD-10-CM

## 2022-06-15 DIAGNOSIS — I10 ESSENTIAL HYPERTENSION: ICD-10-CM

## 2022-06-15 RX ORDER — METOPROLOL TARTRATE 50 MG/1
TABLET, FILM COATED ORAL
Qty: 60 TABLET | Refills: 0 | OUTPATIENT
Start: 2022-06-15

## 2022-06-16 DIAGNOSIS — I71.20 THORACIC AORTIC ANEURYSM, WITHOUT RUPTURE: ICD-10-CM

## 2022-06-16 DIAGNOSIS — I10 ESSENTIAL HYPERTENSION: ICD-10-CM

## 2022-06-16 RX ORDER — METOPROLOL TARTRATE 50 MG/1
TABLET, FILM COATED ORAL
Qty: 60 TABLET | Refills: 0 | OUTPATIENT
Start: 2022-06-16

## 2022-09-21 DIAGNOSIS — I10 RESISTANT HYPERTENSION: ICD-10-CM

## 2022-09-21 RX ORDER — METOPROLOL TARTRATE 100 MG/1
100 TABLET ORAL 2 TIMES DAILY
Qty: 60 TABLET | Refills: 0 | Status: SHIPPED | OUTPATIENT
Start: 2022-09-21 | End: 2022-10-19

## 2022-10-19 DIAGNOSIS — I10 RESISTANT HYPERTENSION: ICD-10-CM

## 2022-10-19 RX ORDER — METOPROLOL TARTRATE 100 MG/1
TABLET ORAL
Qty: 60 TABLET | Refills: 0 | Status: SHIPPED | OUTPATIENT
Start: 2022-10-19 | End: 2022-11-18

## 2022-11-17 DIAGNOSIS — I10 RESISTANT HYPERTENSION: ICD-10-CM

## 2022-11-18 RX ORDER — METOPROLOL TARTRATE 100 MG/1
100 TABLET ORAL 2 TIMES DAILY
Qty: 60 TABLET | Refills: 2 | Status: SHIPPED | OUTPATIENT
Start: 2022-11-18

## 2022-11-30 ENCOUNTER — TELEPHONE (OUTPATIENT)
Dept: FAMILY MEDICINE CLINIC | Facility: CLINIC | Age: 52
End: 2022-11-30

## 2022-11-30 RX ORDER — PROMETHAZINE HYDROCHLORIDE 25 MG/1
TABLET ORAL
Qty: 20 TABLET | Refills: 1 | Status: SHIPPED | OUTPATIENT
Start: 2022-11-30

## 2022-11-30 NOTE — TELEPHONE ENCOUNTER
Pt stated he was positive for covid about 2 weeks ago-had a headache for 5 days and then the vomiting and dry heaving started-stated he now has a sensory overload after his surgery so he is really feeling ill-was given zofran previously-hasn't been anywhere to retest or test for flu-has been drinking plenty of water to keep hydrated

## 2022-11-30 NOTE — TELEPHONE ENCOUNTER
Caller: Merlin Francois    Relationship: Self    Best call back number: [phone]  Requested Prescriptions:   UNABLE TO STOP VOMITING FOR A WEEK NOW      Pharmacy where request should be sent: [facility] - [address] [phone]  - [phone] FX     Additional details provided by patient: PATIENT SAID HE HAS COVID AND HAS NOT BEEN ABLE TO STOP VOMITING FOR A WEEK NOW. WOULD LIKE SOMETHING FOR NAUSEA. ALSO SAID HE HAS AUTO TOXICITY, WAS ACTIVELY VOMITING WHILE TRYING TO REQUEST THIS MEDITATION     Does the patient have less than a 3 day supply:  [x] Yes  [] No    Would you like a call back once the refill request has been completed: [x] Yes [] No    If the office needs to give you a call back, can they leave a voicemail: [x] Yes [] No    Mila Vivar Rep   11/30/22 09:56 EST

## 2022-11-30 NOTE — TELEPHONE ENCOUNTER
Faxed forms [facility] [employer]  To the following fax number [phone] I did receive confirmation  [employer] sent in.    If he continues to have N/V he may need to go to the ER for testing and to get IV fluids.

## 2023-08-15 DIAGNOSIS — I10 RESISTANT HYPERTENSION: ICD-10-CM

## 2023-08-16 DIAGNOSIS — I1A.0 RESISTANT HYPERTENSION: ICD-10-CM

## 2023-08-16 RX ORDER — METOPROLOL TARTRATE 100 MG/1
100 TABLET ORAL 2 TIMES DAILY
Qty: 60 TABLET | Refills: 0 | Status: SHIPPED | OUTPATIENT
Start: 2023-08-16

## 2024-02-20 ENCOUNTER — NURSE TRIAGE (OUTPATIENT)
Dept: CALL CENTER | Facility: HOSPITAL | Age: 54
End: 2024-02-20
Payer: COMMERCIAL

## 2024-02-20 ENCOUNTER — OFFICE VISIT (OUTPATIENT)
Dept: FAMILY MEDICINE CLINIC | Facility: CLINIC | Age: 54
End: 2024-02-20
Payer: COMMERCIAL

## 2024-02-20 ENCOUNTER — TELEPHONE (OUTPATIENT)
Dept: FAMILY MEDICINE CLINIC | Facility: CLINIC | Age: 54
End: 2024-02-20
Payer: COMMERCIAL

## 2024-02-20 VITALS
OXYGEN SATURATION: 96 % | DIASTOLIC BLOOD PRESSURE: 80 MMHG | HEIGHT: 73 IN | SYSTOLIC BLOOD PRESSURE: 134 MMHG | WEIGHT: 299 LBS | BODY MASS INDEX: 39.63 KG/M2 | TEMPERATURE: 97.8 F | HEART RATE: 77 BPM | RESPIRATION RATE: 18 BRPM

## 2024-02-20 DIAGNOSIS — S69.92XD INJURY OF LEFT MIDDLE FINGER, SUBSEQUENT ENCOUNTER: ICD-10-CM

## 2024-02-20 DIAGNOSIS — F43.9 STRESS: ICD-10-CM
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
## 2024-02-20 RX ORDER — OXYCODONE AND ACETAMINOPHEN 10; 325 MG/1; MG/1
1 TABLET ORAL EVERY 8 HOURS PRN
Qty: 15 TABLET | Refills: 0 | Status: SHIPPED | OUTPATIENT
Start: 2024-02-20

## 2024-02-20 RX ORDER — HYDROXYZINE 50 MG/1
TABLET, FILM COATED ORAL
Qty: 20 TABLET | Refills: 1 | Status: SHIPPED | OUTPATIENT
Start: 2024-02-20

## 2024-02-20 RX ORDER — CEPHALEXIN 500 MG/1
CAPSULE ORAL
COMMUNITY
Start: 2024-02-19

## 2024-02-20 NOTE — PROGRESS NOTES
Subjective   Merlin Francois is a 54 y.o. male.     Laceration        He was cutting down a tree limb form neighbors yard  Reached to grab another branch and his hand was pulled into the chain saw  Left middle finger was pulled into the chain saw  He went to [facility] and they bandaged this  Was not able to sew the wound due to injury  Pain is intense at this time    He is anxious as well since this and asks for something to help calm his worry    Pt is on pain medicine with [facility]    The following portions of the patient's history were reviewed and updated as appropriate: allergies, current medications, past family history, past medical history, past social history, past surgical history, and problem list.    Review of Systems    Objective   Physical Exam  Vitals and nursing note reviewed.   Constitutional:       General: He is not in acute distress.     Appearance: Normal appearance. He is well-developed.   Cardiovascular:      Rate and Rhythm: Normal rate and regular rhythm.      Heart sounds: Normal heart sounds.   Pulmonary:      Effort: Pulmonary effort is normal.      Breath sounds: Normal breath sounds.   Musculoskeletal:        Hands:    Neurological:      Mental Status: He is alert and oriented to person, place, and time.   Psychiatric:         Mood and Affect: Mood normal.         Behavior: Behavior normal.         Thought Content: Thought content normal.         Judgment: Judgment normal.       Assessment & Plan   Diagnoses and all orders for this visit:    1. Contact with chainsaw as cause of accidental injury (Primary)  -     Ambulatory Referral to Hand Surgery  -     oxyCODONE-acetaminophen (Percocet)  MG per tablet; Take 1 tablet by mouth Every 8 (Eight) Hours As Needed for Moderate Pain.  Dispense: 15 tablet; Refill: 0    2. Injury of left middle finger, subsequent encounter  -     Ambulatory Referral to Hand Surgery  -     oxyCODONE-acetaminophen (Percocet)  MG per tablet; Take 1  tablet by mouth Every 8 (Eight) Hours As Needed for Moderate Pain.  Dispense: 15 tablet; Refill: 0    3. Stress  -     hydrOXYzine (ATARAX) 50 MG tablet; 1/2-1 po q 6 hours PRN anxiety  Dispense: 20 tablet; Refill: 1    Sounds like tissue of finger was macerated without anything to repair form ER and suture/staple standpoint.  I am going to ask hand specialist to see pt to determine if any further care is needed.  I simply feel higher level of care needed than what I can provide!  Ok pain meds short term (we did discuss he is already on perc 7.5 TID so may be hard to manage acute pain)  Ok PRN atarax for stress, would avoid benzos with high opiate use.

## 2024-02-20 NOTE — TELEPHONE ENCOUNTER
PATIENT CALLED TO CHECK ON THE MESSAGE HE LEFT THIS MORNING   HE SAID HIS BLOOD PRESSURE /125 THIS MORNING AND HE IS CONCERNED   HE CUT HIS FINGER YESTERDAY WITH A CHAINSAW AND IS IN A LOT OF PAIN     WARM TRANSFERRED TO PRACTICE BECAUSE OF BLOOD PRESSURE

## 2024-02-20 NOTE — TELEPHONE ENCOUNTER
"Hub- The left hand was cut by a chain saw.  Not able to glue or suture-He is feels \"antsy\"  He is in great pain. He would like pain medication. His blood pressure is 188/120 also. He was seen in the ER. He does not know what to do- The call was sent to the office. He is speaking with the office.    He is speaking with the office.   "

## 2024-02-20 NOTE — TELEPHONE ENCOUNTER
"Hub- The left hand was cut by a chain saw.  Not able to glue or suture-He is feels \"antsy\"  He is in great pain. He would like pain medication. His blood pressure is 188/120 also. He was seen in the ER. He does not know what to do- The call was sent to the office. He is speaking with the office.   Reason for Disposition   [1] Caller requests to speak ONLY to PCP AND [2] URGENT question    Additional Information   Negative: Lab calling with strep throat test results and triager can call in prescription   Negative: Lab calling with urinalysis test results and triager can call in prescription   Negative: Medication questions   Negative: Medication renewal and refill questions   Negative: Pre-operative or pre-procedural questions   Negative: ED call to PCP (i.e., primary care provider; doctor, NP, or PA)   Negative: Doctor (or NP/PA) call to PCP   Negative: Call about patient who is currently hospitalized   Negative: Lab or radiology calling with CRITICAL test results   Negative: [1] Follow-up call from patient regarding patient's clinical status AND [2] information urgent   Negative: [1] Caller requests to speak to PCP now AND [2] won't tell us reason for call  (Exception: If 10 pm to 6 am, caller must first discuss reason for the call.)   Negative: Notification of hospital admission   Negative: Notification of death   Negative: Caller requesting lab results  (Exception: Routine or non-urgent lab result.)   Negative: Lab or radiology calling with test results   Negative: [1] Follow-up call from patient regarding patient's clinical status AND [2] information NON-URGENT   Negative: [1] Caller requests to speak ONLY to PCP AND [2] NON-URGENT question   Negative: Caller requesting an appointment, triage offered and declined    Answer Assessment - Initial Assessment Questions  1. REASON FOR CALL or QUESTION: \"What is your reason for calling today?\" or \"How can I best  help you?\" or \"What question do you have that I can help " "answer?\"      Needs an office visit   2. CALLER: Document the source of call. (e.g., laboratory, patient).      Patient    Protocols used: PCP Call - No Triage-ADULT-    "

## 2024-02-20 NOTE — TELEPHONE ENCOUNTER
Caller: Merlin Francois    Relationship: Self    Best call back number: [phone]     What medication are you requesting: PAIN MEDICATION    What are your current symptoms: RUN IN WITH CHAINSAW, INJURED FINGER    How long have you been experiencing symptoms: 02/19/24    Have you had these symptoms before:    [] Yes  [x] No    Have you been treated for these symptoms before:   [] Yes  [x] No    If a prescription is needed, what is your preferred pharmacy and phone number: [id number] - [place], [place] - [address] [place] [address] AT [facility] [address] - [phone] [facility] [phone] FX       PLEASE CALL THE PATIENT IF MEDICATION GETS CALLED IN.

## 2024-03-25 ENCOUNTER — OFFICE VISIT (OUTPATIENT)
Dept: FAMILY MEDICINE CLINIC | Facility: CLINIC | Age: 54
End: 2024-03-25
Payer: MEDICARE

## 2024-03-25 VITALS
BODY MASS INDEX: 39.49 KG/M2 | DIASTOLIC BLOOD PRESSURE: 84 MMHG | HEART RATE: 82 BPM | TEMPERATURE: 97.5 F | HEIGHT: 73 IN | SYSTOLIC BLOOD PRESSURE: 138 MMHG | RESPIRATION RATE: 18 BRPM | WEIGHT: 298 LBS

## 2024-03-25 DIAGNOSIS — F51.01 PRIMARY INSOMNIA: ICD-10-CM

## 2024-03-25 DIAGNOSIS — Z12.5 PROSTATE CANCER SCREENING: ICD-10-CM

## 2024-03-25 DIAGNOSIS — M65.331 TRIGGER MIDDLE FINGER OF RIGHT HAND: ICD-10-CM

## 2024-03-25 DIAGNOSIS — I10 ESSENTIAL HYPERTENSION: ICD-10-CM

## 2024-03-25 DIAGNOSIS — S69.92XD INJURY OF LEFT MIDDLE FINGER, SUBSEQUENT ENCOUNTER: Primary | ICD-10-CM

## 2024-03-25 RX ORDER — PREGABALIN 100 MG/1
CAPSULE ORAL
COMMUNITY
Start: 2024-03-04

## 2024-03-25 RX ORDER — METOPROLOL TARTRATE 100 MG/1
TABLET ORAL
Qty: 180 TABLET | OUTPATIENT
Start: 2024-03-25

## 2024-03-25 RX ORDER — METOPROLOL TARTRATE 100 MG/1
50 TABLET ORAL 2 TIMES DAILY
Qty: 180 TABLET | Refills: 1 | Status: SHIPPED | OUTPATIENT
Start: 2024-03-25

## 2024-03-25 RX ORDER — TRAZODONE HYDROCHLORIDE 100 MG/1
50-100 TABLET ORAL NIGHTLY
Qty: 30 TABLET | Refills: 2 | Status: SHIPPED | OUTPATIENT
Start: 2024-03-25

## 2024-03-25 NOTE — PROGRESS NOTES
Chief Complaint  Hypertension (F/u) and Insomnia    Subjective          Merlin Francois presents to [facility]    History of Present Illness  The patient is here for follow-up.    He hit his left middle finger with a chainsaw on 2024. He saw Dr. Segal on 2024. He could not see the hand doctor because his insurance had . He has good motion of his finger and it does not hurt to touch, but it is tender. He denies any bleeding. He has a ground karyn in his right palm between his thumb and finger about 20 years ago. His right middle finger is swollen and sore. He can not make a complete fist. When he raises his fingers back up in the morning, his right middle finger is stuck.    His blood pressure this morning was 200 systolic. He started taking metoprolol 100 mg every day, but it made him feel like he would run out of gas, so he split it in half to 50 mg once a day. His blood pressure was spiking so much from the injection he got at the pain clinic. He can feel when his blood pressure starts rising. He gets dizzy. He checks his blood pressure with a machine. He is not taking amlodipine 10 mg. He denies any heart arrhythmia.    He is not sleeping well. It has gotten to the point that there is no rest in his life. It is making him grouchy, lethargic, and sick. He wakes up more tired than he would have been. He stays up all night. His shoulders hurt so bad. He flips back and forth constantly all night long. He takes tizanidine for muscle spasms in his neck and shoulders, but it does not help. He has never taken trazodone. He takes Lyrica, which helps his pain.    His Trelegy was stopped. He wheezes really bad at night. He feels like he has bubbles in his lungs. He takes Claritin, Zyrtec, and Tylenol Sinus, which works during the day, but not at night. He takes Tylenol Sinus.    Supplemental Information  He has asthma, COPD, and bronchitis.   He does not drink  "alcohol.   His son  of diabetes 2 years ago.        OTHER NOTES:        Review of Systems   Respiratory:  Positive for wheezing (When exposed to allergens).    Cardiovascular: Negative.    Gastrointestinal: Negative.    All other systems reviewed and are negative.       Objective       Vital Signs:   /84   Pulse 82   Temp 97.5 °F (36.4 °C)   Resp 18   Ht 185.4 cm (73\")   Wt 135 kg (298 lb)   BMI 39.32 kg/m²     Physical Exam  Vitals and nursing note reviewed.   Constitutional:       Appearance: He is well-developed. He is obese.   HENT:      Head: Normocephalic and atraumatic.      Right Ear: External ear normal.      Left Ear: External ear normal.   Eyes:      Pupils: Pupils are equal, round, and reactive to light.   Cardiovascular:      Rate and Rhythm: Normal rate and regular rhythm.      Heart sounds: Normal heart sounds.   Pulmonary:      Effort: Pulmonary effort is normal. No respiratory distress.      Breath sounds: Normal breath sounds. No wheezing or rales.   Abdominal:      Tenderness: There is no abdominal tenderness. There is no guarding or rebound.   Musculoskeletal:      Right lower leg: No edema.      Left lower leg: No edema.   Skin:     General: Skin is warm and dry.   Neurological:      Mental Status: He is alert.   Psychiatric:         Behavior: Behavior normal.        Left middle finger distally appears to be well-healing from previous chainsaw accident.  There is a small scar present.  No tenderness.  Good range of motion.    Right middle finger shows some swelling.  Not proximally.  Description of issue is consistent with trigger finger.    Physical Exam      Result Review :            Other Results    Results               Assessment and Plan    Diagnoses and all orders for this visit:    1. Injury of left middle finger, subsequent encounter (Primary)    2. Trigger middle finger of right hand  -     Ambulatory Referral to Orthopedic Surgery    3. Essential hypertension  -     " metoprolol tartrate (LOPRESSOR) 100 MG tablet; Take 0.5 tablets by mouth 2 (Two) Times a Day. Needs appointment.  Dispense: 180 tablet; Refill: 1  -     CBC & Differential  -     Comprehensive Metabolic Panel  -     Lipid Panel With / Chol / HDL Ratio    4. Primary insomnia  -     traZODone (DESYREL) 100 MG tablet; Take 0.5-1 tablets by mouth Every Night.  Dispense: 30 tablet; Refill: 2    5. Prostate cancer screening  -     PSA Screen               DISCUSSION    Assessment & Plan  1. Trigger finger, left middle finger.  This is not related to the karyn going through his hand. I will refer him to orthopedics for further evaluation and treatment.    2. Hypertension.  His blood pressure is good today. He will take metoprolol 50 mg 0.5 tablet twice a day.  Recommend that he take this twice a day instead of just once a day.    3. Insomnia.  I will prescribe trazodone. He will start with 0.5 tablet first. If that does not work, he can take a whole tablet.  Side effects explained including sedation in the morning.    4. Health maintenance.  He may not need a referral since he has seen an allergist.    He has asthma and will follow-up with his pulmonologist.       I will obtain blood work to check his sugar, kidney, liver, cholesterol, blood count, and PSA.  We will get that done today.    Follow-up  He will follow up with Dr. Guerra./Pulmonologist.        Follow Up   Return for follow up depends on review of labs and testing.    Patient was given instructions and counseling regarding his condition or for health maintenance advice. Please see specific information pulled into the AVS if appropriate.       Pierre Barajas MD    Patient or patient representative verbalized consent for the use of Ambient Listening during the visit with  Pierre Barajas MD for chart documentation. 3/25/2024  18:24 EDT

## 2024-03-26 LAB
ALBUMIN SERPL-MCNC: 4.9 G/DL (ref 3.8–4.9)
ALBUMIN/GLOB SERPL: 1.6 {RATIO} (ref 1.2–2.2)
ALP SERPL-CCNC: 99 IU/L (ref 44–121)
ALT SERPL-CCNC: 31 IU/L (ref 0–44)
AST SERPL-CCNC: 22 IU/L (ref 0–40)
BASOPHILS # BLD AUTO: 0 X10E3/UL (ref 0–0.2)
BASOPHILS NFR BLD AUTO: 0 %
BILIRUB SERPL-MCNC: 0.8 MG/DL (ref 0–1.2)
BUN SERPL-MCNC: 16 MG/DL (ref 6–24)
BUN/CREAT SERPL: 16 (ref 9–20)
CALCIUM SERPL-MCNC: 10 MG/DL (ref 8.7–10.2)
CHLORIDE SERPL-SCNC: 103 MMOL/L (ref 96–106)
CHOLEST SERPL-MCNC: 220 MG/DL (ref 100–199)
CHOLEST/HDLC SERPL: 5 RATIO (ref 0–5)
CO2 SERPL-SCNC: 21 MMOL/L (ref 20–29)
CREAT SERPL-MCNC: 1.02 MG/DL (ref 0.76–1.27)
EGFRCR SERPLBLD CKD-EPI 2021: 87 ML/MIN/1.73
EOSINOPHIL # BLD AUTO: 0.1 X10E3/UL (ref 0–0.4)
EOSINOPHIL NFR BLD AUTO: 1 %
ERYTHROCYTE [DISTWIDTH] IN BLOOD BY AUTOMATED COUNT: 12.9 % (ref 11.6–15.4)
GLOBULIN SER CALC-MCNC: 3.1 G/DL (ref 1.5–4.5)
GLUCOSE SERPL-MCNC: 85 MG/DL (ref 70–99)
HCT VFR BLD AUTO: 47.4 % (ref 37.5–51)
HDLC SERPL-MCNC: 44 MG/DL
HGB BLD-MCNC: 15.6 G/DL (ref 13–17.7)
IMM GRANULOCYTES # BLD AUTO: 0 X10E3/UL (ref 0–0.1)
IMM GRANULOCYTES NFR BLD AUTO: 0 %
LDLC SERPL CALC-MCNC: 139 MG/DL (ref 0–99)
LYMPHOCYTES # BLD AUTO: 2.3 X10E3/UL (ref 0.7–3.1)
LYMPHOCYTES NFR BLD AUTO: 22 %
MCH RBC QN AUTO: 29.1 PG (ref 26.6–33)
MCHC RBC AUTO-ENTMCNC: 32.9 G/DL (ref 31.5–35.7)
MCV RBC AUTO: 88 FL (ref 79–97)
MONOCYTES # BLD AUTO: 0.8 X10E3/UL (ref 0.1–0.9)
MONOCYTES NFR BLD AUTO: 7 %
NEUTROPHILS # BLD AUTO: 7 X10E3/UL (ref 1.4–7)
NEUTROPHILS NFR BLD AUTO: 70 %
PLATELET # BLD AUTO: 267 X10E3/UL (ref 150–450)
POTASSIUM SERPL-SCNC: 4.3 MMOL/L (ref 3.5–5.2)
PROT SERPL-MCNC: 8 G/DL (ref 6–8.5)
PSA SERPL-MCNC: 0.4 NG/ML (ref 0–4)
RBC # BLD AUTO: 5.37 X10E6/UL (ref 4.14–5.8)
SODIUM SERPL-SCNC: 139 MMOL/L (ref 134–144)
TRIGL SERPL-MCNC: 203 MG/DL (ref 0–149)
VLDLC SERPL CALC-MCNC: 37 MG/DL (ref 5–40)
WBC # BLD AUTO: 10.2 X10E3/UL (ref 3.4–10.8)

## 2024-04-16 ENCOUNTER — TELEPHONE (OUTPATIENT)
Dept: FAMILY MEDICINE CLINIC | Facility: CLINIC | Age: 54
End: 2024-04-16
Payer: MEDICARE

## 2024-04-16 NOTE — TELEPHONE ENCOUNTER
Please call.  He has cardiovascular disease in the sense that he has an abdominal aortic aneurysm and he has hypertension.  I do not see any known coronary artery disease although he is at risk for developing this.

## 2024-04-16 NOTE — TELEPHONE ENCOUNTER
Caller: YEN WITH [employer]    Relationship: INSURANCE    Best call back number: [phone]     What is the best time to reach you: ANY    Who are you requesting to speak with (clinical staff, provider,  specific staff member): DR. GUEVARA OR HIS NURSE    What was the call regarding: THE PATIENT HAS SIGNED UP FOR [facility]'S CHRONIC CONDITION HEALTH PLAN. THIS PATIENT LISTED A CHRONIC CONDITION OF CARDIOVASCULAR DISEASE. THEY NEED TO HAVE A VERBAL CONFIRMATION OF THIS TO KEEP HIM ON THE PLAN. PLEASE CALL THE NUMBER ABOVE.     Is it okay if the provider responds through [facility]: NO

## 2024-06-26 ENCOUNTER — HOSPITAL ENCOUNTER (OUTPATIENT)
Dept: GENERAL RADIOLOGY | Facility: HOSPITAL | Age: 54
Discharge: HOME | End: 2024-06-26
Admitting: FAMILY MEDICINE
Payer: MEDICARE

## 2024-06-26 ENCOUNTER — OFFICE VISIT (OUTPATIENT)
Dept: FAMILY MEDICINE CLINIC | Facility: CLINIC | Age: 54
End: 2024-06-26
Payer: MEDICARE

## 2024-06-26 VITALS
HEIGHT: 73 IN | WEIGHT: 295 LBS | RESPIRATION RATE: 20 BRPM | TEMPERATURE: 97.8 F | OXYGEN SATURATION: 94 % | DIASTOLIC BLOOD PRESSURE: 90 MMHG | BODY MASS INDEX: 39.1 KG/M2 | HEART RATE: 65 BPM | SYSTOLIC BLOOD PRESSURE: 148 MMHG

## 2024-06-26 DIAGNOSIS — F45.42 PAIN DISORDER ASSOCIATED WITH PSYCHOLOGICAL FACTORS: ICD-10-CM

## 2024-06-26 DIAGNOSIS — M23.92 ACUTE INTERNAL DERANGEMENT OF LEFT KNEE: Primary | ICD-10-CM

## 2024-06-26 DIAGNOSIS — R03.0 ELEVATED BLOOD PRESSURE READING: ICD-10-CM

## 2024-06-26 DIAGNOSIS — M23.92 ACUTE INTERNAL DERANGEMENT OF LEFT KNEE: ICD-10-CM

## 2024-06-26 PROCEDURE — 99214 OFFICE O/P EST MOD 30 MIN: CPT | Performed by: FAMILY MEDICINE

## 2024-06-26 PROCEDURE — 73560 X-RAY EXAM OF KNEE 1 OR 2: CPT

## 2024-06-26 RX ORDER — HYDROCODONE BITARTRATE AND ACETAMINOPHEN 5; 325 MG/1; MG/1
1 TABLET ORAL EVERY 12 HOURS PRN
Qty: 15 TABLET | Refills: 0 | Status: SHIPPED | OUTPATIENT
Start: 2024-06-26

## 2024-06-26 RX ORDER — NAPROXEN 500 MG/1
500 TABLET ORAL 2 TIMES DAILY WITH MEALS
Qty: 60 TABLET | Refills: 0 | Status: SHIPPED | OUTPATIENT
Start: 2024-06-26

## 2024-06-26 NOTE — PROGRESS NOTES
Subjective   Merlin Francois is a 54 y.o. male.     History of Present Illness     Last week he was walking in his yard when he L knee gave out  It bent awkwardly and he had instant pain  Pain was intense  Had mild swelling the next day  It has continues to hurt him though  Hurts to walk, bend, etc.    He has not been able to do the things he needs to do      The following portions of the patient's history were reviewed and updated as appropriate: allergies, current medications, past family history, past medical history, past social history, past surgical history, and problem list.    Review of Systems    Objective   Physical Exam  Vitals and nursing note reviewed.   Constitutional:       General: He is not in acute distress.     Appearance: Normal appearance. He is well-developed.   Cardiovascular:      Rate and Rhythm: Normal rate and regular rhythm.      Heart sounds: Normal heart sounds.   Pulmonary:      Effort: Pulmonary effort is normal.      Breath sounds: Normal breath sounds.   Musculoskeletal:        Legs:    Neurological:      Mental Status: He is alert and oriented to person, place, and time.   Psychiatric:         Mood and Affect: Mood normal.         Behavior: Behavior normal.         Thought Content: Thought content normal.         Judgment: Judgment normal.         Assessment & Plan   Diagnoses and all orders for this visit:    1. Acute internal derangement of left knee (Primary)  -     naproxen (Naprosyn) 500 MG tablet; Take 1 tablet by mouth 2 (Two) Times a Day With Meals.  Dispense: 60 tablet; Refill: 0  -     HYDROcodone-acetaminophen (NORCO) 5-325 MG per tablet; Take 1 tablet by mouth Every 12 (Twelve) Hours As Needed for Severe Pain.  Dispense: 15 tablet; Refill: 0  -     XR Knee 1 or 2 View Left; Future    2. Pain disorder associated with psychological factors  -     Ambulatory Referral to Pain Management    3. Elevated blood pressure reading    I am concerned about internal injury to  meniscus due to mechanism of injury as well as PE today.  Will check XR and then likely check MRI.  Knee immobilizer prescribed to help alleviate discomfort.  NSAID and lortab short term.  He will need valium for MRI    BP up today due to pain, will f/u with PCP    Ok new pain management as last clinic dismissed him

## 2024-07-01 ENCOUNTER — TELEPHONE (OUTPATIENT)
Dept: FAMILY MEDICINE CLINIC | Facility: CLINIC | Age: 54
End: 2024-07-01
Payer: MEDICARE

## 2024-07-01 DIAGNOSIS — M23.92 ACUTE INTERNAL DERANGEMENT OF LEFT KNEE: Primary | ICD-10-CM

## 2024-07-09 ENCOUNTER — TELEPHONE (OUTPATIENT)
Dept: FAMILY MEDICINE CLINIC | Facility: CLINIC | Age: 54
End: 2024-07-09
Payer: MEDICARE

## 2024-07-09 NOTE — TELEPHONE ENCOUNTER
Caller: Merlin Francois    Relationship: Self    Best call back number: [phone]     What was the call regarding: PATIENT WAS DISMISSED FROM PAIN MANAGEMENT FOR NOT BEING ABLE TO PROVIDE URINE THAT DAY. PATIENT WAS ACROSS THE STATE MAKING A BIG SELL. PATIENT HAS MULTIPLE HEALTH ISSUES THAT CAUSE HIM A GREAT DEAL OF PAIN. PATIENT IS OUT OF MEDICATION. PATIENT NEEDS KNEE SURGERY AND HAS SIGNIFICANT NERVE DAMAGE FROM A CARE ACCIDENT. TORN MENISCUS.     oxyCODONE-acetaminophen (PERCOCET) 5-325 MG per tablet     [facility]/pharmacy #[id number] - [place], [place] - [address] [place] [address] - [phone]  - [phone] FX     Is it okay if the provider responds through MyChart: NO

## 2024-07-10 NOTE — TELEPHONE ENCOUNTER
He was specifically told I would give him one time, short pain medicine supply for knee injury.  We have placed pain management referral but this is an issue with his pain clinic, not our office

## 2024-07-10 NOTE — TELEPHONE ENCOUNTER
Pt called again this am requesting pain medications. Stated the knee injections that pain management is wanting to do causes his BP to increase. Nicky scheduled him this Fri w/ Dr Powers. Pt aware to go to ER if symptoms worsen etc.     Dr Powers aware.

## 2024-07-12 ENCOUNTER — TELEPHONE (OUTPATIENT)
Dept: FAMILY MEDICINE CLINIC | Facility: CLINIC | Age: 54
End: 2024-07-12
Payer: MEDICARE

## 2024-07-12 ENCOUNTER — OFFICE VISIT (OUTPATIENT)
Dept: FAMILY MEDICINE CLINIC | Facility: CLINIC | Age: 54
End: 2024-07-12
Payer: MEDICARE

## 2024-07-12 VITALS
HEART RATE: 67 BPM | BODY MASS INDEX: 39.23 KG/M2 | HEIGHT: 73 IN | TEMPERATURE: 97.8 F | RESPIRATION RATE: 16 BRPM | OXYGEN SATURATION: 98 % | DIASTOLIC BLOOD PRESSURE: 110 MMHG | SYSTOLIC BLOOD PRESSURE: 170 MMHG | WEIGHT: 296 LBS

## 2024-07-12 DIAGNOSIS — M23.92 ACUTE INTERNAL DERANGEMENT OF LEFT KNEE: ICD-10-CM

## 2024-07-12 DIAGNOSIS — G89.29 ENCOUNTER FOR CHRONIC PAIN MANAGEMENT: Primary | ICD-10-CM

## 2024-07-12 DIAGNOSIS — I10 ESSENTIAL HYPERTENSION: ICD-10-CM

## 2024-07-12 PROCEDURE — 99214 OFFICE O/P EST MOD 30 MIN: CPT | Performed by: FAMILY MEDICINE

## 2024-07-12 PROCEDURE — G2211 COMPLEX E/M VISIT ADD ON: HCPCS | Performed by: FAMILY MEDICINE

## 2024-07-12 PROCEDURE — 1125F AMNT PAIN NOTED PAIN PRSNT: CPT | Performed by: FAMILY MEDICINE

## 2024-07-12 RX ORDER — CLONIDINE 0.1 MG/24H
PATCH, EXTENDED RELEASE TRANSDERMAL
COMMUNITY
Start: 2024-07-08

## 2024-07-12 RX ORDER — OXYCODONE HYDROCHLORIDE AND ACETAMINOPHEN 5; 325 MG/1; MG/1
TABLET ORAL
Qty: 60 TABLET | Refills: 0 | Status: SHIPPED | OUTPATIENT
Start: 2024-07-12

## 2024-07-12 RX ORDER — LOSARTAN POTASSIUM AND HYDROCHLOROTHIAZIDE 25; 100 MG/1; MG/1
1 TABLET ORAL DAILY
Qty: 30 TABLET | Refills: 3 | Status: SHIPPED | OUTPATIENT
Start: 2024-07-12

## 2024-07-12 NOTE — TELEPHONE ENCOUNTER
Spoke with Allan at [facility] pharmacy and he voiced understanding to cancel pain management rx and fill ours.

## 2024-07-12 NOTE — TELEPHONE ENCOUNTER
Ok to cancel their script and use ours.   patient was told this is a one time script from us until he sees new pain management office.

## 2024-07-12 NOTE — PROGRESS NOTES
Subjective   Merlin Francois is a 54 y.o. male.     History of Present Illness     He is frustrated as his pain clinic dismissed him as he missed a pill count and urine screen  He had an epidural steroid injection which caused his BP to be elevated  He now needs a new pain clinic  He was told we could just write this medicine for him       His BP is elevated still  He is only on metoprolol but this is obviously not working welel      The following portions of the patient's history were reviewed and updated as appropriate: allergies, current medications, past family history, past medical history, past social history, past surgical history, and problem list.    Review of Systems   Constitutional: Negative.    Respiratory: Negative.     Musculoskeletal:  Positive for arthralgias, back pain and myalgias.       Objective   Physical Exam  Vitals and nursing note reviewed.   Constitutional:       General: He is not in acute distress.     Appearance: Normal appearance. He is well-developed.   Cardiovascular:      Rate and Rhythm: Normal rate and regular rhythm.      Heart sounds: Normal heart sounds.   Pulmonary:      Effort: Pulmonary effort is normal.      Breath sounds: Normal breath sounds.   Musculoskeletal:      Comments: Antalgic gait   Neurological:      Mental Status: He is alert and oriented to person, place, and time.   Psychiatric:         Mood and Affect: Mood normal.         Behavior: Behavior normal.         Thought Content: Thought content normal.         Judgment: Judgment normal.         Assessment & Plan   Diagnoses and all orders for this visit:    1. Encounter for chronic pain management (Primary)  -     Ambulatory Referral to Pain Management  -     oxyCODONE-acetaminophen (PERCOCET) 5-325 MG per tablet; 1 PO Q 12 hours PRN  Dispense: 60 tablet; Refill: 0    2. Essential hypertension  -     losartan-hydrochlorothiazide (Hyzaar) 100-25 MG per tablet; Take 1 tablet by mouth Daily.  Dispense: 30  tablet; Refill: 3    3. Acute internal derangement of left knee  -     Ambulatory Referral to Orthopedic Surgery    I commiserated with pt about being dismissed from pain clinic, and discussed I do not write for chronic pain meds.  Will give him #60 at this time but would not give him any more and he will need to make these last until he can get in with new pain clinic.  He was not happy with Dr. Corcoran as they did not give him long term pain meds.  I will refer to Dr. Jovanny Cox at this time.       BP continues to be elevated.  Only on metoprolol.  Will add hyzaar for treatment and he will f/u with PCP    I advised pot that MRI knee is pending for 7/22, will work on ortho as well.

## 2024-07-12 NOTE — TELEPHONE ENCOUNTER
Do they mean pain clinic still sending in his pain meds?  Pt told me they dismissed him from clinic

## 2024-07-12 NOTE — TELEPHONE ENCOUNTER
Pharmacy Name: [facility] [id number] [place], [place] - [address] [facility] [place] [address] - [phone]  - [phone]      Pharmacy representative name: ROBERT     Pharmacy representative phone number: [phone]     What medication are you calling in regards to: oxyCODONE-acetaminophen (PERCOCET) 5-325 MG per tablet     What question does the pharmacy have: ROBERT STATES HIS PREVIOUS PRESCRIPTIONS OF PERCOCET ARE BEING CALLED IN BY PAIN MANAGEMENT, AND HE WAS WANTING TO VERIFY IF MD TALLEY WAS AWARE OF THIS.

## 2024-07-12 NOTE — TELEPHONE ENCOUNTER
Pain management sent in oxycodone on 7/10 7.5 every 12 hours x7 days pt picked up, then another rx for 7.5 daily for 7 days to be picked up on 7/15.    Pharmacist is asking if you want that rx filled? They have it on hold currently, since it looks like pain management is tapering him off.

## 2024-07-17 ENCOUNTER — TELEPHONE (OUTPATIENT)
Dept: FAMILY MEDICINE CLINIC | Facility: CLINIC | Age: 54
End: 2024-07-17
Payer: MEDICARE

## 2024-07-17 NOTE — TELEPHONE ENCOUNTER
DR. WASHINGTON' OFFICE CALLED TO RELAY THAT THEY WILL BE TAKING OVER THE PATIENT PRESCRIPTION OF OXYCODONE. DR. GUEVARA NO LONGER NEEDS TO PROVIDE REFILLS.

## 2024-07-18 ENCOUNTER — TELEPHONE (OUTPATIENT)
Dept: FAMILY MEDICINE CLINIC | Facility: CLINIC | Age: 54
End: 2024-07-18
Payer: MEDICARE

## 2024-07-18 DIAGNOSIS — M54.12 CERVICAL RADICULOPATHY: ICD-10-CM

## 2024-07-18 DIAGNOSIS — M25.512 CHRONIC PAIN OF BOTH SHOULDERS: ICD-10-CM

## 2024-07-18 DIAGNOSIS — G89.29 CHRONIC PAIN OF BOTH SHOULDERS: ICD-10-CM

## 2024-07-18 DIAGNOSIS — M54.2 NECK PAIN: ICD-10-CM

## 2024-07-18 DIAGNOSIS — M25.511 CHRONIC PAIN OF BOTH SHOULDERS: ICD-10-CM

## 2024-07-18 DIAGNOSIS — G89.29 ENCOUNTER FOR CHRONIC PAIN MANAGEMENT: Primary | ICD-10-CM

## 2024-07-18 RX ORDER — OXYCODONE AND ACETAMINOPHEN 7.5; 325 MG/1; MG/1
1 TABLET ORAL EVERY 6 HOURS PRN
Qty: 84 TABLET | Refills: 0 | Status: SHIPPED | OUTPATIENT
Start: 2024-07-18 | End: 2024-08-08

## 2024-07-18 RX ORDER — TIZANIDINE 4 MG/1
4 TABLET ORAL EVERY 6 HOURS PRN
Qty: 90 TABLET | Refills: 0 | Status: SHIPPED | OUTPATIENT
Start: 2024-07-18

## 2024-07-18 RX ORDER — PREGABALIN 100 MG/1
100 CAPSULE ORAL 3 TIMES DAILY
Qty: 90 CAPSULE | Refills: 0 | Status: SHIPPED | OUTPATIENT
Start: 2024-07-18

## 2024-07-18 NOTE — TELEPHONE ENCOUNTER
Please call.  I have given him enough until he sees Dr. Cox on August 8.        ========================  Ulysses reviewed 7/18/2024 . Follow up appt is scheduled on 8/13/2024 .    Last office  : 7/12/2024  Dr. Powers's prescription was canceled.

## 2024-07-18 NOTE — TELEPHONE ENCOUNTER
Caller: Merlin Francois    Relationship: Self    Best call back number: [phone]     What was the call regarding: Dr. Corcoran's office INFORMED PATIENT THAT HE IS OUT OF THE OFFICE/COUNTRY UNTIL NEXT THURSDAY. PATIENT WAS ADVISED TO REQUEST PAIN MEDICATION FROM PCP OFFICE. PLEASE ADVISE.     [facility]/pharmacy #[id number] - [place], [place] - [address] [facility] [address] - [phone]  - [phone] FX     Is it okay if the provider responds through MyChart: NO

## 2024-07-18 NOTE — TELEPHONE ENCOUNTER
Spoke with Georgia from Dr. Pruett's office. They had gave him 2 weeks of medication. Oxycodone 7.5 qty: 14 Bid on 7/10, then Oxycodone 7.5 qty: 7 once a day. Stated they were tapering him down off the pain medication and would do injections but will not take over pain meds since they are interventional pain.   Spoke with pharmacy patient picked up the 7/10, and 7/15 medication. Dr. Powers Prescription has been canceled.    Spoke with patient. Stated he has been taking the oxycodone 7.5 4 times a day as he was prescribed at his previous pain management. Stated he is also out of the Lyrica 100 mg 3 times a day and tizanidine 4 mg every 6 hours.   He is scheduled with a new pain management on 8/8 with Dr. Jovanny Cox. Would like medications sent in to get him to his appointment.

## 2024-07-18 NOTE — TELEPHONE ENCOUNTER
Caller: Merlin Francois    Relationship: Self    Best call back number: [phone]     What was the call regarding:  PATIENT RECIVED A SHOT AT THE PAIN CLINIC IN HIS KNEE AND IT SENT HIM TO THE HOSPITAL WITH UNCNTROLLED BLOOD PRESSURE. STILL UNCONTROLLED. HAS A NEW APPT AT A NEW PAIN CLINIC 8/8 2PM. PATIENT IS OUT OF PAIN MEDICATION. PATIENT HAS SIGNIFICANT NECK AND KNEE PAIN. REQUESTING PAIN MEDICATION. PLEASE CALL PATIENT WITH AN UPDATE ON HIS PAIN MEDICATION REGIMENT.     pregabalin (LYRICA) 100 MG capsule   oxyCODONE-acetaminophen (PERCOCET) 5-325 MG per tablet STATES THAT THIS MEDICATION WAS 7MG      [facility]/pharmacy #[id number] - [address] AT [place] [address] - [phone] [facility] [phone] FX   Is it okay if the provider responds through MyChart: NO

## 2024-07-18 NOTE — TELEPHONE ENCOUNTER
Please call.  We received a call yesterday from Dr. Corcoran's office that they would be taking over the oxycodone and that we were no longer to provide refills.  He needs to call Dr. Corcoran's office.    Continue to monitor blood pressure and office visit here to check that if not improving.

## 2024-07-23 DIAGNOSIS — M23.92 ACUTE INTERNAL DERANGEMENT OF LEFT KNEE: ICD-10-CM

## 2024-07-23 RX ORDER — NAPROXEN 500 MG/1
500 TABLET ORAL 2 TIMES DAILY WITH MEALS
Qty: 60 TABLET | Refills: 0 | OUTPATIENT
Start: 2024-07-23

## 2024-08-06 DIAGNOSIS — G89.29 CHRONIC PAIN OF BOTH SHOULDERS: ICD-10-CM

## 2024-08-06 DIAGNOSIS — M54.2 NECK PAIN: ICD-10-CM

## 2024-08-06 DIAGNOSIS — M54.12 CERVICAL RADICULOPATHY: ICD-10-CM

## 2024-08-06 DIAGNOSIS — G89.29 ENCOUNTER FOR CHRONIC PAIN MANAGEMENT: ICD-10-CM

## 2024-08-06 DIAGNOSIS — M25.512 CHRONIC PAIN OF BOTH SHOULDERS: ICD-10-CM

## 2024-08-06 DIAGNOSIS — M25.511 CHRONIC PAIN OF BOTH SHOULDERS: ICD-10-CM

## 2024-08-06 RX ORDER — TIZANIDINE 4 MG/1
4 TABLET ORAL EVERY 6 HOURS PRN
Qty: 90 TABLET | Refills: 1 | Status: SHIPPED | OUTPATIENT
Start: 2024-08-06

## 2024-08-08 ENCOUNTER — TELEPHONE (OUTPATIENT)
Dept: FAMILY MEDICINE CLINIC | Facility: CLINIC | Age: 54
End: 2024-08-08
Payer: MEDICARE

## 2024-08-08 NOTE — TELEPHONE ENCOUNTER
Pt called for information on pain mgmt referral appt for today. Provided Pt with name from referral and provider phone number

## 2024-08-13 ENCOUNTER — OFFICE VISIT (OUTPATIENT)
Dept: FAMILY MEDICINE CLINIC | Facility: CLINIC | Age: 54
End: 2024-08-13
Payer: MEDICARE

## 2024-08-13 VITALS
HEART RATE: 70 BPM | HEIGHT: 73 IN | DIASTOLIC BLOOD PRESSURE: 88 MMHG | BODY MASS INDEX: 39.49 KG/M2 | TEMPERATURE: 97.4 F | RESPIRATION RATE: 18 BRPM | WEIGHT: 298 LBS | SYSTOLIC BLOOD PRESSURE: 158 MMHG

## 2024-08-13 DIAGNOSIS — J30.9 ALLERGIC RHINITIS, UNSPECIFIED SEASONALITY, UNSPECIFIED TRIGGER: ICD-10-CM

## 2024-08-13 DIAGNOSIS — Z00.00 MEDICARE ANNUAL WELLNESS VISIT, SUBSEQUENT: Primary | ICD-10-CM

## 2024-08-13 DIAGNOSIS — B35.1 TOENAIL FUNGUS: ICD-10-CM

## 2024-08-13 DIAGNOSIS — M65.331 TRIGGER MIDDLE FINGER OF RIGHT HAND: ICD-10-CM

## 2024-08-13 DIAGNOSIS — I10 ESSENTIAL HYPERTENSION: ICD-10-CM

## 2024-08-13 DIAGNOSIS — F41.0 ANXIETY ATTACK: ICD-10-CM

## 2024-08-13 RX ORDER — LORATADINE 10 MG/1
10 TABLET ORAL DAILY
Qty: 90 TABLET | Refills: 1 | Status: SHIPPED | OUTPATIENT
Start: 2024-08-13

## 2024-08-13 RX ORDER — OXYCODONE AND ACETAMINOPHEN 7.5; 325 MG/1; MG/1
1 TABLET ORAL EVERY 6 HOURS
COMMUNITY

## 2024-08-13 RX ORDER — CICLOPIROX 80 MG/ML
SOLUTION TOPICAL NIGHTLY
Qty: 6 ML | Refills: 1 | Status: SHIPPED | OUTPATIENT
Start: 2024-08-13

## 2024-08-13 NOTE — PROGRESS NOTES
Subjective   The ABCs of the Annual Wellness Visit  Medicare Wellness Visit      Merlin Francois is a 54 y.o. patient who presents for a Medicare Wellness Visit.    The following portions of the patient's history were reviewed and   updated as appropriate: allergies, current medications, past family history, past medical history, past social history, past surgical history, and problem list.    Compared to one year ago, the patient's physical   health is worse. Due to pain  Compared to one year ago, the patient's mental   health is the same.    Recent Hospitalizations:  He was not admitted to the hospital during the last year.     Current Medical Providers:  Patient Care Team:  Pierre Barajas MD as PCP - General (Family Medicine)    Outpatient Medications Prior to Visit   Medication Sig Dispense Refill    albuterol sulfate  (90 Base) MCG/ACT inhaler Inhale 1-2 puffs Every 6 (Six) Hours As Needed for Wheezing. 18 g 1    losartan-hydrochlorothiazide (Hyzaar) 100-25 MG per tablet Take 1 tablet by mouth Daily. 30 tablet 3    metoprolol tartrate (LOPRESSOR) 100 MG tablet Take 0.5 tablets by mouth 2 (Two) Times a Day. Needs appointment. 180 tablet 1    oxyCODONE-acetaminophen (PERCOCET) 7.5-325 MG per tablet Take 1 tablet by mouth Every 6 (Six) Hours.      pregabalin (LYRICA) 100 MG capsule Take 1 capsule by mouth 3 times a day. 90 capsule 0    promethazine (PHENERGAN) 25 MG tablet 1/2-1 po q 6 hours PRN 20 tablet 1    tiZANidine (ZANAFLEX) 4 MG tablet TAKE 1 TABLET BY MOUTH EVERY 6 HOURS AS NEEDED FOR MUSCLE SPASMS. 90 tablet 1    traZODone (DESYREL) 100 MG tablet Take 0.5-1 tablets by mouth Every Night. 30 tablet 2    TRELEGY ELLIPTA 100-62.5-25 MCG/INH aerosol powder        cloNIDine (CATAPRES-TTS) 0.1 MG/24HR patch APPLY 1 PATCH EVERY WEEK BY TRANSDERMAL ROUTE AS NEEDED.      hydrOXYzine (ATARAX) 50 MG tablet 1/2-1 po q 6 hours PRN anxiety 20 tablet 1    levocetirizine (XYZAL) 5 MG tablet Take 1 tablet  "by mouth Every Evening.       No facility-administered medications prior to visit.     Opioid medication/s are on active medication list.  and I have evaluated his active treatment plan and pain score trends (see table).  Vitals:    24 1345   PainSc:   4     I have reviewed the chart for potential of high risk medication and harmful drug interactions in the elderly.        Aspirin is not on active medication list.  Aspirin use is not indicated based on review of current medical condition/s. Risk of harm outweighs potential benefits.  .    Patient Active Problem List   Diagnosis    Thoracic aortic aneurysm, without rupture    Pain disorder associated with psychological factors    Severe persistent asthma    Cervical radiculopathy    Primary insomnia     Advance Care Planning Advance Directive is not on file.  ACP discussion was held with the patient during this visit. Will consider to have done            Objective   Vitals:    24 1345   BP: 158/88   Pulse: 70   Resp: 18   Temp: 97.4 °F (36.3 °C)   Weight: 135 kg (298 lb)   Height: 185.4 cm (73\")   PainSc:   4       Estimated body mass index is 39.32 kg/m² as calculated from the following:    Height as of this encounter: 185.4 cm (73\").    Weight as of this encounter: 135 kg (298 lb).            Does the patient have evidence of cognitive impairment? No                                                                                                Health  Risk Assessment    Smoking Status:  Social History     Tobacco Use   Smoking Status Former    Current packs/day: 0.00    Average packs/day: 1 pack/day for 5.0 years (5.0 ttl pk-yrs)    Types: Cigarettes    Start date:     Quit date:     Years since quittin.6   Smokeless Tobacco Never     Alcohol Consumption:  Social History     Substance and Sexual Activity   Alcohol Use No       Fall Risk Screen  STEADI Fall Risk Assessment was completed, and patient is at HIGH risk for falls. Assessment " completed on:2024    Depression Screenin/13/2024     1:57 PM   PHQ-2/PHQ-9 Depression Screening   Little Interest or Pleasure in Doing Things 0-->not at all   Feeling Down, Depressed or Hopeless 0-->not at all   PHQ-9: Brief Depression Severity Measure Score 0     Health Habits and Functional and Cognitive Screenin/13/2024     1:58 PM   Functional & Cognitive Status   Do you have difficulty preparing food and eating? No   Do you have difficulty bathing yourself, getting dressed or grooming yourself? Yes   Do you have difficulty using the toilet? Yes   Do you have difficulty moving around from place to place? Yes   Do you have trouble with steps or getting out of a bed or a chair? Yes   Current Diet Well Balanced Diet   Dental Exam Not up to date   Eye Exam Not up to date   Exercise (times per week) 0 times per week   Current Exercises Include No Regular Exercise   Do you need help using the phone?  No   Are you deaf or do you have serious difficulty hearing?  No   Do you need help to go to places out of walking distance? No   Do you need help shopping? No   Do you need help preparing meals?  No   Do you need help with housework?  No   Do you need help with laundry? No   Do you need help taking your medications? No   Do you need help managing money? No   Do you ever drive or ride in a car without wearing a seat belt? Yes   Have you felt unusual stress, anger or loneliness in the last month? No   Who do you live with? Spouse   If you need help, do you have trouble finding someone available to you? No   Have you been bothered in the last four weeks by sexual problems? No   Do you have difficulty concentrating, remembering or making decisions? Yes           Age-appropriate Screening Schedule:  Refer to the list below for future screening recommendations based on patient's age, sex and/or medical conditions. Orders for these recommended tests are listed in the plan section. The patient has been  provided with a written plan.    Health Maintenance List  Health Maintenance   Topic Date Due    Pneumococcal Vaccine 0-64 (1 of 2 - PCV) Never done    TDAP/TD VACCINES (1 - Tdap) 02/21/2024    COLORECTAL CANCER SCREENING  08/14/2024    INFLUENZA VACCINE  10/01/2024 (Originally 8/1/2024)    COVID-19 Vaccine (1 - 2023-24 season) 12/31/2024 (Originally 9/1/2023)    ZOSTER VACCINE (1 of 2) 02/13/2025 (Originally 1/14/2019)    BMI FOLLOWUP  02/20/2025    LIPID PANEL  03/25/2025    ANNUAL WELLNESS VISIT  08/13/2025    HEPATITIS C SCREENING  Completed                                                                                                                                                CMS Preventative Services Quick Reference  Risk Factors Identified During Encounter  Immunizations Discussed/Encouraged: Influenza  Dental Screening Recommended  Vision Screening Recommended    The above risks/problems have been discussed with the patient.  Pertinent information has been shared with the patient in the After Visit Summary.  An After Visit Summary and PPPS were made available to the patient.    Follow Up:   Next Medicare Wellness visit to be scheduled in 1 year.         Additional E&M Note during same encounter follows:  Patient has additional, significant, and separately identifiable condition(s)/problem(s) that require work above and beyond the Medicare Wellness Visit     Chief Complaint  No chief complaint on file.    Brayden    HPI  Merlin is also being seen today for additional medical problem/s.       The patient is a 54-year-old male who is here for follow-up and Medicare wellness exam.    He continues to experience fluctuations in his blood pressure, with recent readings of 215/115 and 180/110 at home. Dizziness and headaches occur when he takes his blood pressure medication. He denies experiencing chest pain or shortness of breath. His current medication regimen includes metoprolol 100 mg (0.5 tablet twice  daily) and losartan/hydrochlorothiazide (1 tablet daily). However, a full tablet of losartan induces weakness and sickness, leading him to reduce the dosage to 0.5 tablet every 12 hours. A clonidine patch was prescribed to assist in discontinuing his pain medication, but he has chosen not to use it.    He is experiencing intermittent anxiety attacks, primarily at night, lasting a few hours once or twice a week. He wakes up around 12:00 or 1:00 in the morning and struggles to fall back asleep due to a fear of difficulty breathing and feeling hot. His sleep is disrupted due to shoulder pain, which disrupts his sleep. He has tried 2 or 3 different sleep aids, but none have provided relief. Lying on his right side triggers pain in his right shoulder and neck, which then shifts to his left shoulder and neck. Hydroxyzine prescribed by Dr. Segal was ineffective in managing his anxiety.    He saw Dr. Powers for a torn meniscus in his left knee, which occurred after walking across a yard. An MRI was ordered, but his insurance may not cover it, and he has not received a call back. His mobility is limited due to knee pain, and he uses a cane for support when the pain becomes severe. He has fallen once due to knee pain.    He is requesting a prescription for Claritin for his allergies. He has switched from prescription Claritin to Zyrtec, but Zyrtec is ineffective. Xyzal was ineffective, but Claritin was more effective than any other medications.    He has not had a recent dental or eye exam. He can not always wear a seatbelt. His memory is fair. He feels worse than he did a year ago due to the pain in his shoulders, neck, and knee. He does not have a living will. He has not had a colonoscopy recently, but sees Dr. Nevarez every 5 years. He had a left colectomy, but it was performed incorrectly. He occasionally experiences mild soreness from his hernias.    He has toenail fungus on both big toes.    Supplemental Information  He  "has trigger finger in his right middle finger.    FAMILY HISTORY  He has a family history of colon cancer in his father and uncle. His father had liver cancer.    IMMUNIZATIONS  He had a tetanus vaccine on 10/20/2023.          Objective   Vital Signs:  /88   Pulse 70   Temp 97.4 °F (36.3 °C)   Resp 18   Ht 185.4 cm (73\")   Wt 135 kg (298 lb)   BMI 39.32 kg/m²   Physical Exam  Vitals and nursing note reviewed.   Constitutional:       General: He is not in acute distress.     Appearance: Normal appearance. He is well-developed. He is obese. He is not ill-appearing.   HENT:      Head: Normocephalic and atraumatic.      Right Ear: Hearing, tympanic membrane, ear canal and external ear normal.      Left Ear: Hearing, tympanic membrane, ear canal and external ear normal.      Nose: Nose normal. No congestion or rhinorrhea.      Mouth/Throat:      Mouth: Mucous membranes are moist.      Pharynx: No oropharyngeal exudate or posterior oropharyngeal erythema.   Eyes:      General: Lids are normal.      Conjunctiva/sclera: Conjunctivae normal.      Pupils: Pupils are equal, round, and reactive to light.   Neck:      Thyroid: No thyromegaly.   Cardiovascular:      Rate and Rhythm: Normal rate and regular rhythm.      Heart sounds: Normal heart sounds. No murmur heard.     No friction rub.   Pulmonary:      Effort: Pulmonary effort is normal. No respiratory distress.      Breath sounds: Normal breath sounds. No wheezing or rales.   Abdominal:      General: Bowel sounds are normal. There is no distension.      Palpations: Abdomen is soft. There is no mass.      Tenderness: There is no abdominal tenderness. There is no guarding or rebound.   Musculoskeletal:      Right lower leg: No edema.      Left lower leg: No edema.      Comments: + limp, antalgic gait. Pain in the left knee. + trigger finger right middle finger   Skin:     General: Skin is warm and dry.   Neurological:      Mental Status: He is alert. "   Psychiatric:         Mood and Affect: Mood normal.         Speech: Speech normal.         Behavior: Behavior normal.           Vital Signs  Vitals show a blood pressure of 158/88.             Assessment and Plan           The patient is here for follow-up and in addition Medicare wellness exam.    1. Medicare wellness exam.  Continue routine health maintenance including routine dentistry, eye exam, safe DC values, exercise, and healthy diet for weight loss.    2. Hypertension.  Blood pressure has improved some. Continue to monitor and if it is not continuing to improve with medication changes, he will let us know.    3. Left knee pain.  MRI had been ordered, but they have not been able to get in touch with him to schedule that. He was given the name number of central scheduling so he could get that set up.    4. Right middle finger trigger finger.  Referred to orthopedic for evaluation. Referral has been placed.    5. Toenail fungus.  He does not wish to take Lamisil due to potential liver issues. So we will give him Penlac to use once daily for 6 days, then remove with.    6. Chronic pain.  Continue follow-up with Dr. Jovanny Cox. He was managing his pain medication.    7. Anxiety attacks.  We are not able to give anything other than hydroxyzine given that he is on chronic pain medication. He states hydroxyzine was not very helpful and he expressed understanding that he would not be able to take anything for anxiety attacks as needed.    8. Allergic rhinitis.  Refilled Claritin. He states that this has been more helpful than any other medication.       Medicare annual wellness visit, subsequent    Essential hypertension    Anxiety attack    Allergic rhinitis, unspecified seasonality, unspecified trigger    Trigger middle finger of right hand    Toenail fungus      Orders Placed This Encounter   Procedures    Ambulatory Referral to Orthopedic Surgery     Referral Priority:   Routine     Referral Type:    Consultation     Referral Reason:   Specialty Services Required     Requested Specialty:   Orthopedic Surgery     Number of Visits Requested:   1     New Medications Ordered This Visit   Medications    loratadine (Claritin) 10 MG tablet     Sig: Take 1 tablet by mouth Daily.     Dispense:  90 tablet     Refill:  1    ciclopirox (PENLAC) 8 % solution     Sig: Apply  topically to the appropriate area as directed Every Night.     Dispense:  6 mL     Refill:  1          Follow Up   Return in about 3 months (around 11/13/2024), or if symptoms worsen or fail to improve.  Patient was given instructions and counseling regarding his condition or for health maintenance advice. Please see specific information pulled into the AVS if appropriate.  Patient or patient representative verbalized consent for the use of Ambient Listening during the visit with  Pierre Barajas MD for chart documentation. 8/13/2024  19:29 EDT

## 2024-08-15 DIAGNOSIS — M23.92 ACUTE INTERNAL DERANGEMENT OF LEFT KNEE: ICD-10-CM

## 2024-08-16 RX ORDER — NAPROXEN 500 MG/1
500 TABLET ORAL 2 TIMES DAILY WITH MEALS
Qty: 60 TABLET | Refills: 0 | OUTPATIENT
Start: 2024-08-16

## 2024-08-24 ENCOUNTER — HOSPITAL ENCOUNTER (OUTPATIENT)
Facility: HOSPITAL | Age: 54
Discharge: HOME | End: 2024-08-24
Payer: MEDICARE

## 2024-08-24 DIAGNOSIS — M23.92 ACUTE INTERNAL DERANGEMENT OF LEFT KNEE: ICD-10-CM

## 2024-08-24 PROCEDURE — 73721 MRI JNT OF LWR EXTRE W/O DYE: CPT

## 2024-12-23 NOTE — TELEPHONE ENCOUNTER
PT CALLED TO SEE IF  IS ABLE O SEND RX  HYDROcodone-acetaminophen (NORCO) 7.5-325 MG , OVER TO PHARMACY TODAY BECAUSE HE ONLY HAS 1 TABLET LEFT FOR LATER TO DAY AND WILL OUT, ALS HIS PHARMACY WILL BE CLOSED ON Monday.    PLEASE ADVISE.  CALL BACK:[phone]      The site was marked. Prepped: left abdomen. Prepped with: ChloraPrep. The patient was draped.

## 2025-01-28 DIAGNOSIS — I10 ESSENTIAL HYPERTENSION: ICD-10-CM

## 2025-01-29 RX ORDER — LOSARTAN POTASSIUM AND HYDROCHLOROTHIAZIDE 25; 100 MG/1; MG/1
1 TABLET ORAL DAILY
Qty: 30 TABLET | Refills: 3 | Status: SHIPPED | OUTPATIENT
Start: 2025-01-29

## 2025-02-28 ENCOUNTER — TELEPHONE (OUTPATIENT)
Dept: FAMILY MEDICINE CLINIC | Facility: CLINIC | Age: 55
End: 2025-02-28

## 2025-02-28 DIAGNOSIS — F51.01 PRIMARY INSOMNIA: ICD-10-CM

## 2025-03-03 RX ORDER — TRAZODONE HYDROCHLORIDE 100 MG/1
50-100 TABLET ORAL NIGHTLY
Qty: 30 TABLET | Refills: 2 | Status: SHIPPED | OUTPATIENT
Start: 2025-03-03

## 2025-03-31 ENCOUNTER — HOSPITAL ENCOUNTER (OUTPATIENT)
Dept: GENERAL RADIOLOGY | Facility: HOSPITAL | Age: 55
Discharge: HOME | End: 2025-03-31
Admitting: FAMILY MEDICINE
Payer: MEDICARE

## 2025-03-31 ENCOUNTER — OFFICE VISIT (OUTPATIENT)
Dept: FAMILY MEDICINE CLINIC | Facility: CLINIC | Age: 55
End: 2025-03-31
Payer: MEDICARE

## 2025-03-31 ENCOUNTER — TELEPHONE (OUTPATIENT)
Dept: FAMILY MEDICINE CLINIC | Facility: CLINIC | Age: 55
End: 2025-03-31

## 2025-03-31 VITALS
HEART RATE: 78 BPM | RESPIRATION RATE: 18 BRPM | WEIGHT: 307 LBS | HEIGHT: 73 IN | SYSTOLIC BLOOD PRESSURE: 158 MMHG | TEMPERATURE: 97.3 F | BODY MASS INDEX: 40.69 KG/M2 | DIASTOLIC BLOOD PRESSURE: 100 MMHG

## 2025-03-31 DIAGNOSIS — J45.50 SEVERE PERSISTENT ASTHMA WITHOUT COMPLICATION: ICD-10-CM

## 2025-03-31 DIAGNOSIS — F51.01 PRIMARY INSOMNIA: ICD-10-CM

## 2025-03-31 DIAGNOSIS — M25.552 LEFT HIP PAIN: ICD-10-CM

## 2025-03-31 DIAGNOSIS — Z12.5 PROSTATE CANCER SCREENING: ICD-10-CM

## 2025-03-31 DIAGNOSIS — I77.810 AORTIC ROOT DILATATION: ICD-10-CM

## 2025-03-31 DIAGNOSIS — I10 ESSENTIAL HYPERTENSION: Primary | ICD-10-CM

## 2025-03-31 PROCEDURE — 73502 X-RAY EXAM HIP UNI 2-3 VIEWS: CPT

## 2025-03-31 RX ORDER — ALBUTEROL SULFATE 90 UG/1
1-2 INHALANT RESPIRATORY (INHALATION) EVERY 6 HOURS PRN
Qty: 18 G | Refills: 1 | Status: SHIPPED | OUTPATIENT
Start: 2025-03-31

## 2025-03-31 RX ORDER — METOPROLOL TARTRATE 100 MG/1
100 TABLET ORAL 2 TIMES DAILY
Qty: 180 TABLET | Refills: 1 | Status: SHIPPED | OUTPATIENT
Start: 2025-03-31

## 2025-03-31 NOTE — TELEPHONE ENCOUNTER
Please call Dr Jovanny Cox office and need records from last 4 visits and recent urine drug screens.

## 2025-03-31 NOTE — PROGRESS NOTES
Chief Complaint  Hypertension (F/u), talk about weight , and Pain (Talk about pain and unable to sleep )    Subjective          Merlin Francois presents to [facility]    HPI         The patient is a 55-year-old male here for follow-up of hypertension, weight gain, and pain with decreasing sleep.    He has been experiencing chronic pain, which was previously managed at a pain clinic under the care of Dr. Cox. His current regimen includes 3 tablets of Percocet 7.5, which he reports as ineffective. He was also prescribed Lyrica, which provided some relief. However, due to abnormal urine test results, his nighttime medication was discontinued. He has an upcoming appointment with his pain management specialist on 04/23/2025. He has been taken off pregabalin without any explanation.    He reports weight gain and expresses concern about a potential aneurysm. He recalls a consultation with a cardiologist who advised that his aneurysm would not pose a significant risk if his blood pressure remained controlled. However, he has been unable to achieve optimal blood pressure control despite medication. He is currently not under the care of any cardiologist for his aneurysm.    He is currently on metoprolol, which he tolerates well and does not cause any adverse effects. He takes half a tablet of metoprolol unless his blood pressure exceeds 200/100, in which case he takes a full tablet. He reports that taking a full tablet of metoprolol helps normalize his blood pressure. His blood pressure readings have been inconsistent, with systolic readings as high as 220 and diastolic readings around 106. He has been informed that his elevated blood pressure could be a result of his pain, which in turn could exacerbate his aneurysm.    He reports difficulty sleeping, often only managing to sleep for short periods of 10 minutes. He continues to take trazodone, but it no longer seems to be effective.  "He has tried CBD gummies for sleep, which he found beneficial, but is unsure if he can continue their use due to potential interactions with his other medications.    He has asthma and needs a refill of albuterol inhaler and Trelegy for about 3 months to get through the allergy season.    He slipped a little bit the other day during the last big rain they had. The next day, he needed help to sit up on his bed, stand up, and even move. The pain has decreased some, but it is still there consistently. He is not sure if he pinched a nerve or twisted a muscle or what, but he did something on the left side. It is hurting from about a third of the way up on that side of his back all the way down into his knee.    He has been discussing with a couple of doctors about getting his meniscus fixed, but the ones he wants to deal with do not accept his insurance. It has good and bad days now. It does not hurt as bad as it first did, but it has its days.    He has not had blood work done in a year and wants to check his blood sugar.    SOCIAL HISTORY  - Does not smoke  - Does not drink alcohol  - Does not use drugs    MEDICATIONS  - Current:     - Percocet 7.5 mg    - Metoprolol    - Trazodone    - Albuterol inhaler    - Trelegy  - Discontinued:     - Pregabalin       OTHER NOTES:          Review of Systems   Respiratory: Negative.     Cardiovascular: Negative.    Gastrointestinal:  Positive for diarrhea.   Musculoskeletal:  Positive for arthralgias and back pain.   All other systems reviewed and are negative.       Objective       Vital Signs:   /100   Pulse 78   Temp 97.3 °F (36.3 °C)   Resp 18   Ht 185.4 cm (73\")   Wt (!) 139 kg (307 lb)   BMI 40.50 kg/m²     Physical Exam  Vitals and nursing note reviewed.   Constitutional:       General: He is not in acute distress.     Appearance: Normal appearance. He is well-developed. He is obese. He is not ill-appearing.   HENT:      Head: Normocephalic and atraumatic.      " Right Ear: Tympanic membrane and external ear normal.      Left Ear: Tympanic membrane and external ear normal.      Mouth/Throat:      Mouth: Mucous membranes are moist.      Pharynx: No oropharyngeal exudate or posterior oropharyngeal erythema.   Cardiovascular:      Rate and Rhythm: Normal rate and regular rhythm.      Heart sounds: Normal heart sounds.   Pulmonary:      Effort: Pulmonary effort is normal. No respiratory distress.      Breath sounds: Normal breath sounds. No wheezing or rales.   Abdominal:      Tenderness: There is no abdominal tenderness. There is no guarding or rebound.   Musculoskeletal:      Right lower leg: No edema.      Left lower leg: No edema.      Comments: Tenderness of the left lateral hip.   Skin:     General: Skin is warm and dry.   Neurological:      Mental Status: He is alert.   Psychiatric:         Mood and Affect: Mood normal.         Behavior: Behavior normal.             Vital Signs  Blood pressure is 164/98. Upon recheck, blood pressure is 158/100 and heart rate is 78.       Result Review :            Other Results    Results  - Imaging:    - Mild dilatation of the aorta as it comes up to the heart             Assessment and Plan    Diagnoses and all orders for this visit:    1. Essential hypertension (Primary)  -     Adult Transthoracic Echo Complete W/ Cont if Necessary Per Protocol; Future  -     metoprolol tartrate (LOPRESSOR) 100 MG tablet; Take 1 tablet by mouth 2 (Two) Times a Day.  Dispense: 180 tablet; Refill: 1  -     CBC & Differential  -     Comprehensive Metabolic Panel  -     Lipid Panel With / Chol / HDL Ratio    2. Primary insomnia    3. Aortic root dilatation  -     Adult Transthoracic Echo Complete W/ Cont if Necessary Per Protocol; Future    4. Severe persistent asthma without complication  -     albuterol sulfate  (90 Base) MCG/ACT inhaler; Inhale 1-2 puffs Every 6 (Six) Hours As Needed for Wheezing.  Dispense: 18 g; Refill: 1  -      Fluticasone-Umeclidin-Vilant (Trelegy Ellipta) 100-62.5-25 MCG/ACT inhaler; Inhale 1 puff Daily.  Dispense: 1 each; Refill: 2    5. Left hip pain  -     XR Hip With or Without Pelvis 2 - 3 View Left; Future    6. Prostate cancer screening  -     PSA Screen               DISCUSSION       Chronic pain.  He reports that his current pain regimen, including three Percocet 7.5 mg tablets daily, is ineffective. He previously found relief with the addition of Lyrica but was taken off it due to abnormal urine tests. He is advised to discuss the potential benefits of medical marijuana with his pain management specialist. He is also advised to inform his pain management specialist about the effectiveness of pregabalin during his next visit on 04/23/2025.    Hypertension.  His blood pressure remains elevated at 164/98 mmHg despite taking metoprolol. He will increase his metoprolol dosage to one tablet twice daily. If he experiences dizziness or lightheadedness, he should inform the office immediately.    Aortic dilatation.  He has a history of aortic dilatation, last evaluated approximately 4.5 years ago. An echocardiogram will be ordered to monitor the status of his aneurysm.    Asthma.  He requires refills for his albuterol inhaler and Trelegy, especially during allergy season. Prescriptions for albuterol and Trelegy will be sent to [facility] in [place].    Left hip pain.  He reports persistent left hip pain following a slip about two weeks ago. An x-ray of his left hip will be ordered to investigate the cause of his pain.    Insomnia.  He continues to experience poor sleep despite taking trazodone. He is advised to discuss alternative sleep aids, including the potential use of THC gummies, with his pain management specialist.    Weight gain.  He expresses concern about weight gain and its impact on his health, particularly in relation to his aneurysm. Weight loss medications are not recommended due to his  hypertension.    Health maintenance.  A comprehensive blood panel will be ordered to assess his overall health status, including blood sugar and cholesterol levels. Prostate cancer screening will also be conducted.       Ulysses dated 3/31/2025  was reviewed and appropriate.     Follow Up   No follow-ups on file.    Patient was given instructions and counseling regarding his condition or for health maintenance advice. Please see specific information pulled into the AVS if appropriate.       Pierre Barajas MD    Patient or patient representative verbalized consent for the use of Ambient Listening during the visit with  Pierre Barajas MD for chart documentation. 3/31/2025  18:25 EDT

## 2025-04-01 LAB
ALBUMIN SERPL-MCNC: 4.5 G/DL (ref 3.8–4.9)
ALP SERPL-CCNC: 97 IU/L (ref 44–121)
ALT SERPL-CCNC: 36 IU/L (ref 0–44)
AST SERPL-CCNC: 28 IU/L (ref 0–40)
BASOPHILS # BLD AUTO: 0 X10E3/UL (ref 0–0.2)
BASOPHILS NFR BLD AUTO: 1 %
BILIRUB SERPL-MCNC: 0.9 MG/DL (ref 0–1.2)
BUN SERPL-MCNC: 14 MG/DL (ref 6–24)
BUN/CREAT SERPL: 16 (ref 9–20)
CALCIUM SERPL-MCNC: 9.6 MG/DL (ref 8.7–10.2)
CHLORIDE SERPL-SCNC: 105 MMOL/L (ref 96–106)
CHOLEST SERPL-MCNC: 224 MG/DL (ref 100–199)
CHOLEST/HDLC SERPL: 6.2 RATIO (ref 0–5)
CO2 SERPL-SCNC: 21 MMOL/L (ref 20–29)
CREAT SERPL-MCNC: 0.87 MG/DL (ref 0.76–1.27)
EGFRCR SERPLBLD CKD-EPI 2021: 101 ML/MIN/1.73
EOSINOPHIL # BLD AUTO: 0.2 X10E3/UL (ref 0–0.4)
EOSINOPHIL NFR BLD AUTO: 2 %
ERYTHROCYTE [DISTWIDTH] IN BLOOD BY AUTOMATED COUNT: 12.9 % (ref 11.6–15.4)
GLOBULIN SER CALC-MCNC: 3 G/DL (ref 1.5–4.5)
GLUCOSE SERPL-MCNC: 84 MG/DL (ref 70–99)
HCT VFR BLD AUTO: 47.3 % (ref 37.5–51)
HDLC SERPL-MCNC: 36 MG/DL
HGB BLD-MCNC: 15.9 G/DL (ref 13–17.7)
IMM GRANULOCYTES # BLD AUTO: 0 X10E3/UL (ref 0–0.1)
IMM GRANULOCYTES NFR BLD AUTO: 1 %
LDLC SERPL CALC-MCNC: 131 MG/DL (ref 0–99)
LYMPHOCYTES # BLD AUTO: 2.5 X10E3/UL (ref 0.7–3.1)
LYMPHOCYTES NFR BLD AUTO: 29 %
MCH RBC QN AUTO: 30.3 PG (ref 26.6–33)
MCHC RBC AUTO-ENTMCNC: 33.6 G/DL (ref 31.5–35.7)
MCV RBC AUTO: 90 FL (ref 79–97)
MONOCYTES # BLD AUTO: 0.6 X10E3/UL (ref 0.1–0.9)
MONOCYTES NFR BLD AUTO: 7 %
NEUTROPHILS # BLD AUTO: 5.2 X10E3/UL (ref 1.4–7)
NEUTROPHILS NFR BLD AUTO: 60 %
PLATELET # BLD AUTO: 264 X10E3/UL (ref 150–450)
POTASSIUM SERPL-SCNC: 4.4 MMOL/L (ref 3.5–5.2)
PROT SERPL-MCNC: 7.5 G/DL (ref 6–8.5)
PSA SERPL-MCNC: 0.4 NG/ML (ref 0–4)
RBC # BLD AUTO: 5.24 X10E6/UL (ref 4.14–5.8)
SODIUM SERPL-SCNC: 138 MMOL/L (ref 134–144)
TRIGL SERPL-MCNC: 320 MG/DL (ref 0–149)
VLDLC SERPL CALC-MCNC: 57 MG/DL (ref 5–40)
WBC # BLD AUTO: 8.6 X10E3/UL (ref 3.4–10.8)

## 2025-04-06 NOTE — TELEPHONE ENCOUNTER
Reviewed notes and drug screen. One drug screen was neg, others positive. Continue care with pain mgt     Pierre Barajas MD

## 2025-04-16 ENCOUNTER — OFFICE VISIT (OUTPATIENT)
Age: 55
End: 2025-04-16
Payer: MEDICARE

## 2025-04-16 VITALS
SYSTOLIC BLOOD PRESSURE: 146 MMHG | DIASTOLIC BLOOD PRESSURE: 90 MMHG | BODY MASS INDEX: 41.27 KG/M2 | HEIGHT: 72 IN | WEIGHT: 304.7 LBS

## 2025-04-16 DIAGNOSIS — M53.3 SACROILIAC JOINT DYSFUNCTION OF LEFT SIDE: ICD-10-CM

## 2025-04-16 DIAGNOSIS — M54.16 LUMBAR RADICULOPATHY: ICD-10-CM

## 2025-04-16 DIAGNOSIS — M16.12 ARTHRITIS OF LEFT HIP: Primary | ICD-10-CM

## 2025-04-16 NOTE — PROGRESS NOTES
[facility] [place] [facility] Office Visit     Office Visit       Date: 04/16/2025   Patient Name: Merlin Francois  MRN: [id number]  YOB: 1970    Referring Physician: Pierre Barajas MD     Chief Complaint:   Chief Complaint   Patient presents with    Left Hip - Pain       History of Present Illness:   Merlin Francois is a 55 y.o. male who presents with left hip pain for 3 week(s). Onset atraumatic and gradual in nature. Pain is localized to lateral trochanter and gluteal region and is a 8/10 on the pain scale.Pain is described as aching and stabbing. Associated symptoms include pain, popping, and giving way/buckling.  The pain is worse with walking, standing, sitting, climbing stairs, sleeping, lying on affected side, rising from seated position, and any movement of the joint; heat improve the pain. Previous treatments have included: nothing since symptom onset. Although some transient relief was reported with these interventions, these conservative measures have failed and symptoms have persisted. The patient is limited in daily activities and has had a significant decrease in quality of life as a result. He denies fevers, chills, or constitutional symptoms.    Subjective   Review of Systems: Review of Systems   Constitutional:  Negative for chills, fever, unexpected weight gain and unexpected weight loss.   HENT:  Negative for congestion, postnasal drip and rhinorrhea.    Eyes:  Negative for blurred vision.   Respiratory:  Negative for shortness of breath.    Cardiovascular:  Negative for leg swelling.   Gastrointestinal:  Negative for abdominal pain, nausea and vomiting.   Genitourinary:  Negative for difficulty urinating.   Musculoskeletal:  Positive for arthralgias. Negative for gait problem, joint swelling and myalgias.   Skin:  Negative for skin lesions and wound.   Neurological:  Negative for dizziness, weakness, light-headedness and numbness.    Hematological:  Does not bruise/bleed easily.   Psychiatric/Behavioral:  Negative for depressed mood.         I have reviewed the following portions of the patient's history:History of Present Illness and review of systems.    Past Medical History:   Past Medical History:   Diagnosis Date    Aneurysm     Arthritis     Asthma     Asthma     COPD (chronic obstructive pulmonary disease)     Diverticulosis     History of transfusion     Hypertension     Ototoxicity     Extensive abdominal damage    Peptic ulceration     Sepsis     Stomach ulcer        Past Surgical History:   Past Surgical History:   Procedure Laterality Date    APPENDECTOMY      COLECTOMY PARTIAL / TOTAL      x3     COLECTOMY TOTAL      COLON SURGERY      ILEOSTOMY         Family History:   Family History   Problem Relation Age of Onset    Osteoarthritis Mother     Diabetes Mother     Heart attack Mother     Arthritis Mother     Asthma Mother     Hypertension Mother     Migraines Mother     Cancer Father     Osteoarthritis Father     Arthritis Father     Liver disease Father        Social History:   Social History     Socioeconomic History    Marital status:     Number of children: 4   Tobacco Use    Smoking status: Former     Current packs/day: 0.00     Average packs/day: 1 pack/day for 5.0 years (5.0 ttl pk-yrs)     Types: Cigarettes     Start date:      Quit date:      Years since quittin.3    Smokeless tobacco: Never   Vaping Use    Vaping status: Never Used   Substance and Sexual Activity    Alcohol use: No    Drug use: No       Medications:   Current Outpatient Medications:     albuterol sulfate  (90 Base) MCG/ACT inhaler, Inhale 1-2 puffs Every 6 (Six) Hours As Needed for Wheezing., Disp: 18 g, Rfl: 1    ciclopirox (PENLAC) 8 % solution, Apply  topically to the appropriate area as directed Every Night., Disp: 6 mL, Rfl: 1    Fluticasone-Umeclidin-Vilant (Trelegy Ellipta) 100-62.5-25 MCG/ACT inhaler, Inhale 1 puff  "Daily., Disp: 1 each, Rfl: 2    loratadine (Claritin) 10 MG tablet, Take 1 tablet by mouth Daily., Disp: 90 tablet, Rfl: 1    losartan-hydrochlorothiazide (HYZAAR) 100-25 MG per tablet, TAKE 1 TABLET BY MOUTH EVERY DAY, Disp: 30 tablet, Rfl: 3    metoprolol tartrate (LOPRESSOR) 100 MG tablet, Take 1 tablet by mouth 2 (Two) Times a Day., Disp: 180 tablet, Rfl: 1    oxyCODONE-acetaminophen (PERCOCET) 7.5-325 MG per tablet, Take 1 tablet by mouth Every 6 (Six) Hours., Disp: , Rfl:     pregabalin (LYRICA) 100 MG capsule, Take 1 capsule by mouth 3 times a day., Disp: 90 capsule, Rfl: 0    promethazine (PHENERGAN) 25 MG tablet, 1/2-1 po q 6 hours PRN, Disp: 20 tablet, Rfl: 1    rosuvastatin (Crestor) 10 MG tablet, Take 1 tablet by mouth Daily., Disp: 90 tablet, Rfl: 1    tiZANidine (ZANAFLEX) 4 MG tablet, TAKE 1 TABLET BY MOUTH EVERY 6 HOURS AS NEEDED FOR MUSCLE SPASMS., Disp: 90 tablet, Rfl: 1    traZODone (DESYREL) 100 MG tablet, TAKE 0.5-1 TABLETS BY MOUTH EVERY NIGHT., Disp: 30 tablet, Rfl: 2    Allergies:   Allergies   Allergen Reactions    Gentamicin Other (See Comments)     Severe inner ear damage       I reviewed the patient's chief complaint, history of present illness, review of systems, past medical history, surgical history, family history, social history, medications and allergy list.     Objective    Vital Signs:   Vitals:    04/16/25 1428   BP: 146/90   Weight: (!) 138 kg (304 lb 11.2 oz)   Height: 181.6 cm (71.5\")     Body mass index is 41.9 kg/m².   Class 3 Severe Obesity (BMI >=40). Obesity-related health conditions include the following: hypertension, coronary heart disease, diabetes mellitus, and impaired fasting glucose. Obesity is newly identified. BMI is is above average; BMI management plan is completed. We discussed portion control and increasing exercise.     Patient reports that he is a former smoker. He quit smoking in 2005.  He has not resumed smoking since that time.  This behavior was " applauded and she was encouraged to continue in smoking cessation.  We will continue to monitor at subsequent visits.    Ortho Exam:  Gait and Station: Appearance: normal gait, no limp, and ambulating with no assistive devices.   Cardiovascular System: Arterial Pulses Right: dorsalis pedis pulse normal and posterior tibialis pulse normal. Arterial Pulses Left: dorsalis pedis pulse normal and posterior tibialis pulse normal. Edema Right: none. Edema Left: none.   Lumbar Spine: Inspection: normal alignment. Bony Palpation: no tenderness of the spinous process, the transverse process, the paraspinals, or the coccyx and tenderness of the sacrum. Special Tests: seated straight leg raising test positive.   Hip/Pelvis Appearance: Inspection: normal axial alignment.   Hips: Bony Palpation Left: no tenderness of the iliac crest, the ASIS, the PSIS, the pubic tubercle, the sciatic notch, the ischial tuberosity, the SI joint, or the greater trochanter. Soft Tissue Palpation Left: no tenderness of the hip flexor muscles or the hip adductor muscles and tenderness of the biceps femoris muscle, the semitendinous muscle, the semimembranous muscle, and the piriformis. Active Range of Motion Left: normal, flexion normal, extension normal, internal rotation normal, and external rotation normal. Passive Range of Motion Left: no flexion contracture, hamstring tightness popliteal angle, or pain with motion and normal, flexion normal, extension normal, internal rotation normal, and external rotation normal. Special Tests Left: Tito-Fabere test positive; negative FADIR, axial load. Strength Left: normal 5/5.   Skin: Left Lower Extremity: normal.   Neurologic: Sensation on the Left: T12 normal, L1 normal, L2 normal, L3 normal, L4 normal, S2 normal, and S3,4,5 normal.    Results Review:   Imaging Results (Last 24 Hours)       ** No results found for the last 24 hours. **         I personally reviewed and interpreted radiographs of the  left hip from 3/31/2025.  No acute fracture or dislocation.  Mild hip arthritis noted.    Procedures    Assessment / Plan    Assessment/Plan:   Diagnoses and all orders for this visit:    1. Arthritis of left hip (Primary)    2. Sacroiliac joint dysfunction of left side  -     Ambulatory Referral to Spine Surgery  -     MRI Lumbar Spine Without Contrast; Future    3. Lumbar radiculopathy  -     Ambulatory Referral to Spine Surgery  -     MRI Lumbar Spine Without Contrast; Future    Plan:  Patient referred here today for left hip pain after mild arthritis was noted on recent radiographs.  Radiographs reviewed and does have arthritic complaints.  However on physical exam, he is having pain in the SI joint with radicular symptoms down the posterior and lateral left leg.  He does not have any intra-articular findings on physical exam.  Recommend MRI of the lumbar spine to evaluate further and for source of pain, radicular symptoms.  Refer to spine surgery for additional evaluation and to follow-up on the MRI.  Continue with oxycodone for pain control.  Follow-up with me as needed.    Previous studies reviewed: Radiographs left hip 3/31/2025. CMP 3/31/2025.  Office visit 3/31/2025.    Follow Up:   Return if symptoms worsen or fail to improve.      Jr Harris MD  [facility] [place] [facility]

## 2025-05-28 DIAGNOSIS — M23.92 ACUTE INTERNAL DERANGEMENT OF LEFT KNEE: ICD-10-CM

## 2025-05-28 RX ORDER — NAPROXEN 500 MG/1
500 TABLET ORAL 2 TIMES DAILY WITH MEALS
Qty: 60 TABLET | Refills: 0 | OUTPATIENT
Start: 2025-05-28

## 2025-08-25 DIAGNOSIS — F51.01 PRIMARY INSOMNIA: ICD-10-CM

## 2025-08-25 RX ORDER — TRAZODONE HYDROCHLORIDE 100 MG/1
50-100 TABLET ORAL NIGHTLY
Qty: 30 TABLET | Refills: 2 | Status: SHIPPED | OUTPATIENT
Start: 2025-08-25